# Patient Record
Sex: FEMALE | Race: WHITE | NOT HISPANIC OR LATINO | Employment: OTHER | URBAN - METROPOLITAN AREA
[De-identification: names, ages, dates, MRNs, and addresses within clinical notes are randomized per-mention and may not be internally consistent; named-entity substitution may affect disease eponyms.]

---

## 2019-09-10 ENCOUNTER — OFFICE VISIT (OUTPATIENT)
Dept: FAMILY MEDICINE CLINIC | Facility: CLINIC | Age: 64
End: 2019-09-10
Payer: MEDICARE

## 2019-09-10 ENCOUNTER — APPOINTMENT (OUTPATIENT)
Dept: LAB | Facility: CLINIC | Age: 64
End: 2019-09-10
Payer: MEDICARE

## 2019-09-10 VITALS
OXYGEN SATURATION: 98 % | HEIGHT: 62 IN | TEMPERATURE: 97.1 F | BODY MASS INDEX: 38.28 KG/M2 | HEART RATE: 60 BPM | WEIGHT: 208 LBS | SYSTOLIC BLOOD PRESSURE: 140 MMHG | DIASTOLIC BLOOD PRESSURE: 80 MMHG | RESPIRATION RATE: 16 BRPM

## 2019-09-10 DIAGNOSIS — R92.8 ABNORMAL MAMMOGRAM OF LEFT BREAST: ICD-10-CM

## 2019-09-10 DIAGNOSIS — H61.20 WAX IN EAR: ICD-10-CM

## 2019-09-10 DIAGNOSIS — E78.00 PURE HYPERCHOLESTEROLEMIA: ICD-10-CM

## 2019-09-10 DIAGNOSIS — Z76.89 ENCOUNTER TO ESTABLISH CARE: Primary | ICD-10-CM

## 2019-09-10 DIAGNOSIS — Z82.49 FH: CAD (CORONARY ARTERY DISEASE): ICD-10-CM

## 2019-09-10 DIAGNOSIS — M85.80 OSTEOPENIA, UNSPECIFIED LOCATION: ICD-10-CM

## 2019-09-10 DIAGNOSIS — Z12.39 BREAST CANCER SCREENING: ICD-10-CM

## 2019-09-10 DIAGNOSIS — I10 BENIGN ESSENTIAL HYPERTENSION: ICD-10-CM

## 2019-09-10 DIAGNOSIS — Z85.820 HISTORY OF MELANOMA: ICD-10-CM

## 2019-09-10 DIAGNOSIS — Z01.419 ENCOUNTER FOR GYNECOLOGICAL EXAMINATION WITHOUT ABNORMAL FINDING: ICD-10-CM

## 2019-09-10 LAB
ALBUMIN SERPL BCP-MCNC: 4.2 G/DL (ref 3.5–5)
ALP SERPL-CCNC: 83 U/L (ref 46–116)
ALT SERPL W P-5'-P-CCNC: 25 U/L (ref 12–78)
ANION GAP SERPL CALCULATED.3IONS-SCNC: 6 MMOL/L (ref 4–13)
AST SERPL W P-5'-P-CCNC: 20 U/L (ref 5–45)
BILIRUB SERPL-MCNC: 0.4 MG/DL (ref 0.2–1)
BUN SERPL-MCNC: 14 MG/DL (ref 5–25)
CALCIUM SERPL-MCNC: 9.8 MG/DL (ref 8.3–10.1)
CHLORIDE SERPL-SCNC: 109 MMOL/L (ref 100–108)
CHOLEST SERPL-MCNC: 198 MG/DL (ref 50–200)
CO2 SERPL-SCNC: 27 MMOL/L (ref 21–32)
CREAT SERPL-MCNC: 0.63 MG/DL (ref 0.6–1.3)
GFR SERPL CREATININE-BSD FRML MDRD: 94 ML/MIN/1.73SQ M
GLUCOSE P FAST SERPL-MCNC: 86 MG/DL (ref 65–99)
HDLC SERPL-MCNC: 74 MG/DL (ref 40–60)
LDLC SERPL CALC-MCNC: 111 MG/DL (ref 0–100)
NONHDLC SERPL-MCNC: 124 MG/DL
POTASSIUM SERPL-SCNC: 3.8 MMOL/L (ref 3.5–5.3)
PROT SERPL-MCNC: 7.5 G/DL (ref 6.4–8.2)
SODIUM SERPL-SCNC: 142 MMOL/L (ref 136–145)
TRIGL SERPL-MCNC: 66 MG/DL

## 2019-09-10 PROCEDURE — 99204 OFFICE O/P NEW MOD 45 MIN: CPT | Performed by: PHYSICIAN ASSISTANT

## 2019-09-10 PROCEDURE — 36415 COLL VENOUS BLD VENIPUNCTURE: CPT

## 2019-09-10 PROCEDURE — 80061 LIPID PANEL: CPT

## 2019-09-10 PROCEDURE — 80053 COMPREHEN METABOLIC PANEL: CPT

## 2019-09-10 RX ORDER — AMLODIPINE BESYLATE AND BENAZEPRIL HYDROCHLORIDE 5; 20 MG/1; MG/1
1 CAPSULE ORAL DAILY
COMMUNITY
Start: 2019-08-01 | End: 2019-10-25 | Stop reason: SDUPTHER

## 2019-09-10 RX ORDER — CALCIUM CARBONATE/VITAMIN D3 600 MG-10
1 TABLET ORAL DAILY
COMMUNITY

## 2019-09-10 RX ORDER — HYDROCHLOROTHIAZIDE 25 MG/1
25 TABLET ORAL DAILY
COMMUNITY
Start: 2019-08-09 | End: 2019-10-25 | Stop reason: SDUPTHER

## 2019-09-10 RX ORDER — ECHINACEA 400 MG
1 CAPSULE ORAL DAILY
COMMUNITY

## 2019-09-10 RX ORDER — PRAVASTATIN SODIUM 10 MG
10 TABLET ORAL
COMMUNITY
Start: 2019-06-13 | End: 2020-12-14 | Stop reason: SDUPTHER

## 2019-09-10 RX ORDER — MELATONIN
1000 DAILY
COMMUNITY

## 2019-09-10 RX ORDER — MULTIVITAMIN
1 TABLET ORAL DAILY
COMMUNITY

## 2019-09-10 NOTE — PROGRESS NOTES
New Patient    Jessica Rodriguez 71 y o  female   Date:  9/10/2019      Assessment and Plan:    Sayra Sanders was seen today for establish care  Diagnoses and all orders for this visit:    Encounter to establish care    History of melanoma  - given resources for new dermatologist     Breast cancer screening  -     Mammo screening bilateral w 3d & cad; Future    Abnormal mammogram of left breast  -     Mammo screening bilateral w 3d & cad; Future  - she wishes to still go to [facility] as established with them    Encounter for gynecological examination without abnormal finding  -     Ambulatory referral to Gynecology; Future    Osteopenia, unspecified location  -     DXA body comp analysis; Future  - vit d and calcium supplement    Benign essential hypertension  -     Comprehensive metabolic panel; Future  -     Lipid panel; Future  - stable, continue current regimen  - wt loss    FH: CAD (coronary artery disease)  -     Comprehensive metabolic panel; Future  -     Lipid panel; Future    Pure hypercholesterolemia  -     Comprehensive metabolic panel; Future  -     Lipid panel; Future  - continue statin    Wax in ear  - not impacted to suggest irrigation today       Depression Screening Follow-up Plan: Patient's depression screening was negative with a PHQ-2 score of 0  BMI Counseling: Body mass index is 38 04 kg/m²  The BMI is above normal  Nutrition recommendations include reducing portion sizes and decreasing overall calorie intake  Exercise recommendations include exercising 3-5 times per week  HPI:  Chief Complaint   Patient presents with   [address] PCP was seen 6 months ago - does not think medicare wellness has been completed  Last CRC about 8 years ago      HPI   Patient is a 70 yo female who presents to establish care after moving from [facility]  She was still seeing her prior PCP from [facility] every 6 months, last seen in Feb  She cannot recall last dexa but definitely over 2 years ago   She has hx of osteopenia and takes vit d and calcium  She has been following with a dermatologist every 6 months due to melanoma was found early on L shoulder, melanoma found about 5 years ago  She has follow up this Thursday but will be looking for a new one up here  She was given cards to look into Dr Mariely Aranda or Dr Ramiro Alarcon  She was seeing a cardiologist once a year, last seen in June  He was taking care of cholesterol  No recent changes  She never had stress testing done  She had carotid artery done and heart cam done for angio screening through husbands work  She is getting mammograms yearly at [facility] following cyst removal with residual scar tissue  A titatimun clip was placed  She was doing weight watchers and got down to 170 about 3 years ago  She got off track when she got a puppy and sprained her ankle, has not been as active  She had colonoscopy about 8 years ago, will get name of place  It was normal, recommend to repeat in 10 years  She thinks she did get prevnar but not sure  She was seeing an eye doctor yearly before moving, looking for a new one  She has regular dentist appt  She goes to periodontist for long time  ROS: Review of Systems   Constitutional: Negative  Respiratory: Negative  Cardiovascular: Negative  Gastrointestinal: Negative  Genitourinary: Negative  Musculoskeletal: Negative  Skin: Negative for rash and wound  A lot of freckles    Neurological: Negative  Psychiatric/Behavioral: Negative  History reviewed  No pertinent past medical history    Patient Active Problem List   Diagnosis    Abnormal mammogram of left breast    Benign essential hypertension    FH: CAD (coronary artery disease)    Melanoma in situ ([address] )    Osteopenia    Pure hypercholesterolemia    Vitamin D deficiency       Past Surgical History:   Procedure Laterality Date    BREAST CYST EXCISION      SALPINGOOPHORECTOMY      WISDOM TOOTH EXTRACTION         Social History Socioeconomic History    Marital status: /Civil Union     Spouse name: None    Number of children: None    Years of education: None    Highest education level: None   Occupational History    None   Social Needs    Financial resource strain: None    Food insecurity:     Worry: None     Inability: None    Transportation needs:     Medical: None     Non-medical: None   Tobacco Use    Smoking status: Former Smoker    Smokeless tobacco: Never Used   Substance and Sexual Activity    Alcohol use: Yes     Frequency: 2-3 times a week     Comment: couple glasses of wine a week    Drug use: Never    Sexual activity: None   Lifestyle    Physical activity:     Days per week: None     Minutes per session: None    Stress: None   Relationships    Social connections:     Talks on phone: None     Gets together: None     Attends [employer] service: None     Active member of club or organization: None     Attends meetings of clubs or organizations: None     Relationship status: None    Intimate partner violence:     Fear of current or ex partner: None     Emotionally abused: None     Physically abused: None     Forced sexual activity: None   Other Topics Concern    None   Social History Narrative    None       Family History   Problem Relation Age of Onset    Hypertension Mother     Cancer Father         lung    Heart disease Father     Arthritis Father     Asthma Father        Allergies   Allergen Reactions    Clindamycin Diarrhea and GI Intolerance     Other reaction(s): Weakness   Severe stomach pains, muscle aches  Abdominal pains   weakness      Rosuvastatin Myalgia     Myalgias  Leg cramps      Simvastatin      Myalgias  Muscle spasms           Current Outpatient Medications:     amLODIPine-benazepril (LOTREL 5-20) 5-20 MG per capsule, Take 1 capsule by mouth daily, Disp: , Rfl:     aspirin 81 MG tablet, Take 81 mg by mouth daily, Disp: , Rfl:     Calcium Carb-Cholecalciferol (CALCIUM-VITAMIN D) 600-400 MG-UNIT TABS, Take 1 tablet by mouth daily, Disp: , Rfl:     cholecalciferol (VITAMIN D3) 1,000 units tablet, Take 1,000 Units by mouth daily, Disp: , Rfl:     Coenzyme Q10 (CO Q 10) 100 MG CAPS, Take 1 capsule by mouth daily, Disp: , Rfl:     Flaxseed, Linseed, (FLAXSEED OIL) 1000 MG CAPS, Take 1 capsule by mouth daily, Disp: , Rfl:     hydrochlorothiazide (HYDRODIURIL) 25 mg tablet, Take 25 mg by mouth daily, Disp: , Rfl:     Multiple Vitamin (MULTIVITAMIN) tablet, Take 1 tablet by mouth daily, Disp: , Rfl:     pravastatin (PRAVACHOL) 10 mg tablet, Take 10 mg by mouth, Disp: , Rfl:       Physical Exam:  /80 (BP Location: Left arm, Patient Position: Sitting, Cuff Size: Large)   Pulse 60   Temp (!) 97 1 °F (36 2 °C) (Tympanic)   Resp 16   Ht 5' 2" (1 575 m)   Wt 94 3 kg (208 lb)   SpO2 98%   BMI 38 04 kg/m²     Physical Exam   Constitutional: She is oriented to person, place, and time  Vital signs are normal  She appears well-developed and well-nourished  No distress  obese   HENT:   Head: Normocephalic and atraumatic  Right Ear: Tympanic membrane, external ear and ear canal normal    Left Ear: Tympanic membrane, external ear and ear canal normal    Nose: Nose normal    Mouth/Throat: Oropharynx is clear and moist    Eyes: Pupils are equal, round, and reactive to light  Conjunctivae and lids are normal    Neck: Trachea normal and normal range of motion  Neck supple  No thyromegaly present  Cardiovascular: Normal rate, regular rhythm, S1 normal, S2 normal and intact distal pulses  Exam reveals no gallop  No murmur heard  Pulmonary/Chest: Breath sounds normal  No respiratory distress  She has no wheezes  She has no rhonchi  She has no rales  Abdominal: Soft  Normal appearance and bowel sounds are normal  She exhibits no mass  There is no hepatosplenomegaly  There is no tenderness  Musculoskeletal: Normal range of motion  She exhibits no edema or deformity  Lymphadenopathy:     She has no cervical adenopathy  Neurological: She is alert and oriented to person, place, and time  She has normal reflexes  No cranial nerve deficit or sensory deficit  Skin: Skin is warm and dry  No rash noted  No cyanosis  No pallor  Nails show no clubbing  Freckles along upper extremities, chest and neck   Psychiatric: She has a normal mood and affect   Her behavior is normal  Cognition and memory are normal

## 2019-10-10 ENCOUNTER — OFFICE VISIT (OUTPATIENT)
Dept: FAMILY MEDICINE CLINIC | Facility: CLINIC | Age: 64
End: 2019-10-10
Payer: MEDICARE

## 2019-10-10 VITALS
SYSTOLIC BLOOD PRESSURE: 130 MMHG | DIASTOLIC BLOOD PRESSURE: 80 MMHG | TEMPERATURE: 97.7 F | BODY MASS INDEX: 37.86 KG/M2 | OXYGEN SATURATION: 98 % | RESPIRATION RATE: 17 BRPM | HEART RATE: 78 BPM | WEIGHT: 207 LBS

## 2019-10-10 DIAGNOSIS — Z78.0 POSTMENOPAUSAL: ICD-10-CM

## 2019-10-10 DIAGNOSIS — Z11.59 NEED FOR HEPATITIS C SCREENING TEST: ICD-10-CM

## 2019-10-10 DIAGNOSIS — Z13.6 ENCOUNTER FOR ABDOMINAL AORTIC ANEURYSM (AAA) SCREENING: ICD-10-CM

## 2019-10-10 DIAGNOSIS — Z23 FLU VACCINE NEED: ICD-10-CM

## 2019-10-10 DIAGNOSIS — R93.7 ABNORMAL BONE DENSITY SCREENING: Primary | ICD-10-CM

## 2019-10-10 DIAGNOSIS — I10 BENIGN ESSENTIAL HYPERTENSION: ICD-10-CM

## 2019-10-10 DIAGNOSIS — E78.2 MIXED HYPERLIPIDEMIA: ICD-10-CM

## 2019-10-10 DIAGNOSIS — Z23 PNEUMOCOCCAL VACCINATION GIVEN: ICD-10-CM

## 2019-10-10 PROCEDURE — G0009 ADMIN PNEUMOCOCCAL VACCINE: HCPCS

## 2019-10-10 PROCEDURE — G0402 INITIAL PREVENTIVE EXAM: HCPCS | Performed by: PHYSICIAN ASSISTANT

## 2019-10-10 PROCEDURE — 90682 RIV4 VACC RECOMBINANT DNA IM: CPT

## 2019-10-10 PROCEDURE — 90670 PCV13 VACCINE IM: CPT

## 2019-10-10 PROCEDURE — G0008 ADMIN INFLUENZA VIRUS VAC: HCPCS

## 2019-10-10 NOTE — PATIENT INSTRUCTIONS
Medicare Preventive Visit Patient Instructions  Thank you for completing your Welcome to Medicare Visit or Medicare Annual Wellness Visit today  Your next wellness visit will be due in one year (10/10/2020)  The screening/preventive services that you may require over the next 5-10 years are detailed below  Some tests may not apply to you based off risk factors and/or age  Screening tests ordered at today's visit but not completed yet may show as past due  Also, please note that scanned in results may not display below  Preventive Screenings:  Service Recommendations Previous Testing/Comments   Colorectal Cancer Screening  * Colonoscopy    * Fecal Occult Blood Test (FOBT)/Fecal Immunochemical Test (FIT)  * Fecal DNA/Cologuard Test  * Flexible Sigmoidoscopy Age: 54-65 years old   Colonoscopy: every 10 years (may be performed more frequently if at higher risk)  OR  FOBT/FIT: every 1 year  OR  Cologuard: every 3 years  OR  Sigmoidoscopy: every 5 years  Screening may be recommended earlier than age 48 if at higher risk for colorectal cancer  Also, an individualized decision between you and your healthcare provider will decide whether screening between the ages of 74-80 would be appropriate  Colonoscopy: Not on file  FOBT/FIT: Not on file  Cologuard: Not on file  Sigmoidoscopy: Not on file         Breast Cancer Screening Age: 36 years old  Frequency: every 1-2 years  Not required if history of left and right mastectomy Mammogram: Not on file       Cervical Cancer Screening Between the ages of 21-29, pap smear recommended once every 3 years  Between the ages of 33-67, can perform pap smear with HPV co-testing every 5 years     Recommendations may differ for women with a history of total hysterectomy, cervical cancer, or abnormal pap smears in past  Pap Smear: Not on file    Screening Not Indicated   Hepatitis C Screening Once for adults born between [facility]  More frequently in patients at high risk for Hepatitis C Hep C Antibody: Not on file       Diabetes Screening 1-2 times per year if you're at risk for diabetes or have pre-diabetes Fasting glucose: 86 mg/dL   A1C: No results in last 5 years    Screening Current   Cholesterol Screening Once every 5 years if you don't have a lipid disorder  May order more often based on risk factors  Lipid panel: 09/10/2019    Screening Not Indicated  History Lipid Disorder     Other Preventive Screenings Covered by Medicare:  1  Abdominal Aortic Aneurysm (AAA) Screening: covered once if your at risk  You're considered to be at risk if you have a family history of AAA  2  Lung Cancer Screening: covers low dose CT scan once per year if you meet all of the following conditions: (1) Age 50-69; (2) No signs or symptoms of lung cancer; (3) Current smoker or have quit smoking within the last 15 years; (4) You have a tobacco smoking history of at least 30 pack years (packs per day multiplied by number of years you smoked); (5) You get a written order from a healthcare provider  3  Glaucoma Screening: covered annually if you're considered high risk: (1) You have diabetes OR (2) Family history of glaucoma OR (3)  aged 48 and older OR (3)  American aged 72 and older  3  Osteoporosis Screening: covered every 2 years if you meet one of the following conditions: (1) You're estrogen deficient and at risk for osteoporosis based off medical history and other findings; (2) Have a vertebral abnormality; (3) On glucocorticoid therapy for more than 3 months; (4) Have primary hyperparathyroidism; (5) On osteoporosis medications and need to assess response to drug therapy  · Last bone density test (DXA Scan): Not on file  5  HIV Screening: covered annually if you're between the age of 12-76  Also covered annually if you are younger than 13 and older than 72 with risk factors for HIV infection   For pregnant patients, it is covered up to 3 times per pregnancy  Immunizations:  Immunization Recommendations   Influenza Vaccine Annual influenza vaccination during flu season is recommended for all persons aged >= 6 months who do not have contraindications   Pneumococcal Vaccine (Prevnar and Pneumovax)  * Prevnar = PCV13  * Pneumovax = PPSV23   Adults 25-60 years old: 1-3 doses may be recommended based on certain risk factors  Adults 72 years old: Prevnar (PCV13) vaccine recommended followed by Pneumovax (PPSV23) vaccine  If already received PPSV23 since turning 65, then PCV13 recommended at least one year after PPSV23 dose  Hepatitis B Vaccine 3 dose series if at intermediate or high risk (ex: diabetes, end stage renal disease, liver disease)   Tetanus (Td) Vaccine - COST NOT COVERED BY MEDICARE PART B Following completion of primary series, a booster dose should be given every 10 years to maintain immunity against tetanus  Td may also be given as tetanus wound prophylaxis  Tdap Vaccine - COST NOT COVERED BY MEDICARE PART B Recommended at least once for all adults  For pregnant patients, recommended with each pregnancy  Shingles Vaccine (Shingrix) - COST NOT COVERED BY MEDICARE PART B  2 shot series recommended in those aged 48 and above     Health Maintenance Due:      Topic Date Due    Hepatitis C Screening  1955    MAMMOGRAM  1955    CRC Screening: Colonoscopy  1955     Immunizations Due:      Topic Date Due    DTaP,Tdap,and Td Vaccines (1 - Tdap) 07/07/2015    Pneumococcal Vaccine: 65+ Years (1 of 2 - PCV13) 02/25/2019    INFLUENZA VACCINE  07/01/2019     Advance Directives   What are advance directives? Advance directives are legal documents that state your wishes and plans for medical care  These plans are made ahead of time in case you lose your ability to make decisions for yourself  Advance directives can apply to any medical decision, such as the treatments you want, and if you want to donate organs     What are the types of advance directives? There are many types of advance directives, and each state has rules about how to use them  You may choose a combination of any of the following:  · Living will: This is a written record of the treatment you want  You can also choose which treatments you do not want, which to limit, and which to stop at a certain time  This includes surgery, medicine, IV fluid, and tube feedings  · Durable power of  for healthcare [facility]): This is a written record that states who you want to make healthcare choices for you when you are unable to make them for yourself  This person, called a proxy, is usually a family member or a friend  You may choose more than 1 proxy  · Do not resuscitate (DNR) order:  A DNR order is used in case your heart stops beating or you stop breathing  It is a request not to have certain forms of treatment, such as CPR  A DNR order may be included in other types of advance directives  · Medical directive: This covers the care that you want if you are in a coma, near death, or unable to make decisions for yourself  You can list the treatments you want for each condition  Treatment may include pain medicine, surgery, blood transfusions, dialysis, IV or tube feedings, and a ventilator (breathing machine)  · Values history: This document has questions about your views, beliefs, and how you feel and think about life  This information can help others choose the care that you would choose  Why are advance directives important? An advance directive helps you control your care  Although spoken wishes may be used, it is better to have your wishes written down  Spoken wishes can be misunderstood, or not followed  Treatments may be given even if you do not want them  An advance directive may make it easier for your family to make difficult choices about your care     Weight Management   Why it is important to manage your weight:  Being overweight increases your risk of health conditions such as heart disease, high blood pressure, type 2 diabetes, and certain types of cancer  It can also increase your risk for osteoarthritis, sleep apnea, and other respiratory problems  Aim for a slow, steady weight loss  Even a small amount of weight loss can lower your risk of health problems  How to lose weight safely:  A safe and healthy way to lose weight is to eat fewer calories and get regular exercise  You can lose up about 1 pound a week by decreasing the number of calories you eat by 500 calories each day  Healthy meal plan for weight management:  A healthy meal plan includes a variety of foods, contains fewer calories, and helps you stay healthy  A healthy meal plan includes the following:  · Eat whole-grain foods more often  A healthy meal plan should contain fiber  Fiber is the part of grains, fruits, and vegetables that is not broken down by your body  Whole-grain foods are healthy and provide extra fiber in your diet  Some examples of whole-grain foods are whole-wheat breads and pastas, oatmeal, brown rice, and bulgur  · Eat a variety of vegetables every day  Include dark, leafy greens such as spinach, kale, leila greens, and mustard greens  Eat yellow and orange vegetables such as carrots, sweet potatoes, and winter squash  · Eat a variety of fruits every day  Choose fresh or canned fruit (canned in its own juice or light syrup) instead of juice  Fruit juice has very little or no fiber  · Eat low-fat dairy foods  Drink fat-free (skim) milk or 1% milk  Eat fat-free yogurt and low-fat cottage cheese  Try low-fat cheeses such as mozzarella and other reduced-fat cheeses  · Choose meat and other protein foods that are low in fat  Choose beans or other legumes such as split peas or lentils  Choose fish, skinless poultry (chicken or turkey), or lean cuts of red meat (beef or pork)  Before you cook meat or poultry, cut off any visible fat  · Use less fat and oil    Try baking foods instead of frying them  Add less fat, such as margarine, sour cream, regular salad dressing and mayonnaise to foods  Eat fewer high-fat foods  Some examples of high-fat foods include french fries, doughnuts, ice cream, and cakes  · Eat fewer sweets  Limit foods and drinks that are high in sugar  This includes candy, cookies, regular soda, and sweetened drinks  Exercise:  Exercise at least 30 minutes per day on most days of the week  Some examples of exercise include walking, biking, dancing, and swimming  You can also fit in more physical activity by taking the stairs instead of the elevator or parking farther away from stores  Ask your healthcare provider about the best exercise plan for you  © Copyright [employer] 2018 Information is for End User's use only and may not be sold, redistributed or otherwise used for commercial purposes   All illustrations and images included in CareNotes® are the copyrighted property of [employer]  or [address] [place] [address]

## 2019-10-10 NOTE — PROGRESS NOTES
Assessment and Plan:     Problem List Items Addressed This Visit        Cardiovascular and Mediastinum    Benign essential hypertension    Relevant Orders    Comprehensive metabolic panel      Other Visit Diagnoses     Abnormal bone density screening    -  Primary    Relevant Orders    DXA body comp analysis    Postmenopausal        Relevant Orders    DXA body comp analysis    Flu vaccine need        Relevant Orders    influenza vaccine, 6760-8422, quadrivalent, recombinant, PF, 0 5 mL, for patients 18 yr+ (FLUBLOK) (Completed)    Encounter for abdominal aortic aneurysm (AAA) screening        Relevant Orders    US abdominal aorta screening aaa    Need for hepatitis C screening test        Relevant Orders    Hepatitis C antibody    Mixed hyperlipidemia        Relevant Orders    Lipid panel    Pneumococcal vaccination given        Relevant Orders    PNEUMOCOCCAL CONJUGATE VACCINE 13-VALENT GREATER THAN 6 MONTHS (Completed)           Preventive health issues were discussed with patient, and age appropriate screening tests were ordered as noted in patient's After Visit Summary  Personalized health advice and appropriate referrals for health education or preventive services given if needed, as noted in patient's After Visit Summary  History of Present Illness:     Patient presents for Medicare Annual Wellness visit    Patient Care Team:  Esau Luna PA-C as PCP - General (Family Medicine)     Problem List:     Patient Active Problem List   Diagnosis    Abnormal mammogram of left breast    Benign essential hypertension    FH: CAD (coronary artery disease)    Melanoma in situ ([address] )    Osteopenia    Pure hypercholesterolemia    Vitamin D deficiency      Past Medical and Surgical History:     No past medical history on file    Past Surgical History:   Procedure Laterality Date    BREAST CYST EXCISION      SALPINGOOPHORECTOMY      WISDOM TOOTH EXTRACTION        Family History:     Family History Problem Relation Age of Onset    Hypertension Mother    Vilma Splinter Cancer Father         lung    Heart disease Father     Arthritis Father     Asthma Father       Social History:     Social History     Socioeconomic History    Marital status: /Civil Union     Spouse name: None    Number of children: None    Years of education: None    Highest education level: None   Occupational History    None   Social Needs    Financial resource strain: None    Food insecurity:     Worry: None     Inability: None    Transportation needs:     Medical: None     Non-medical: None   Tobacco Use    Smoking status: Former Smoker    Smokeless tobacco: Never Used   Substance and Sexual Activity    Alcohol use: Yes     Frequency: 2-3 times a week     Comment: couple glasses of wine a week    Drug use: Never    Sexual activity: None   Lifestyle    Physical activity:     Days per week: None     Minutes per session: None    Stress: None   Relationships    Social connections:     Talks on phone: None     Gets together: None     Attends Nondenominational service: None     Active member of club or organization: None     Attends meetings of clubs or organizations: None     Relationship status: None    Intimate partner violence:     Fear of current or ex partner: None     Emotionally abused: None     Physically abused: None     Forced sexual activity: None   Other Topics Concern    None   Social History Narrative    None       Medications and Allergies:     Current Outpatient Medications   Medication Sig Dispense Refill    amLODIPine-benazepril (LOTREL 5-20) 5-20 MG per capsule Take 1 capsule by mouth daily      aspirin 81 MG tablet Take 81 mg by mouth daily      Calcium Carb-Cholecalciferol (CALCIUM-VITAMIN D) 600-400 MG-UNIT TABS Take 1 tablet by mouth daily      cholecalciferol (VITAMIN D3) 1,000 units tablet Take 1,000 Units by mouth daily      Coenzyme Q10 (CO Q 10) 100 MG CAPS Take 1 capsule by mouth daily      Flaxseed, Linseed, (FLAXSEED OIL) 1000 MG CAPS Take 1 capsule by mouth daily      hydrochlorothiazide (HYDRODIURIL) 25 mg tablet Take 25 mg by mouth daily      Multiple Vitamin (MULTIVITAMIN) tablet Take 1 tablet by mouth daily      pravastatin (PRAVACHOL) 10 mg tablet Take 10 mg by mouth       No current facility-administered medications for this visit  Allergies   Allergen Reactions    Clindamycin Diarrhea and GI Intolerance     Other reaction(s): Weakness   Severe stomach pains, muscle aches  Abdominal pains  weakness      Rosuvastatin Myalgia     Myalgias  Leg cramps      Simvastatin      Myalgias  Muscle spasms        Immunizations:     Immunization History   Administered Date(s) Administered     Influenza (IM) Preservative Free 10/13/2010    INFLUENZA 10/02/2012, 01/17/2014, 01/07/2015, 01/08/2016, 02/12/2018    Influenza, recombinant, quadrivalent,injectable, preservative free 10/10/2019    Pneumococcal Conjugate 13-Valent 10/10/2019    Td (adult), adsorbed 07/06/2015    Zoster 07/21/2016      Health Maintenance:         Topic Date Due    Hepatitis C Screening  1955    MAMMOGRAM  1955    DXA SCAN  02/02/2017    CRC Screening: Colonoscopy  03/07/2027         Topic Date Due    DTaP,Tdap,and Td Vaccines (1 - Tdap) 07/07/2015      Medicare Health Risk Assessment:     /80   Pulse 78   Temp 97 7 °F (36 5 °C)   Resp 17   Wt 93 9 kg (207 lb)   SpO2 98%   BMI 37 86 kg/m²      Hailee Finley is here for her Welcome to Medicare visit  Health Risk Assessment:   Patient rates overall health as excellent  Patient feels that their physical health rating is much better  Eyesight was rated as slightly worse  Hearing was rated as same  Patient feels that their emotional and mental health rating is much better  Pain experienced in the last 7 days has been some  Patient's pain rating has been 1/10  Patient states that she has experienced no weight loss or gain in last 6 months       Depression Screening:   PHQ-2 Score: 0      Fall Risk Screening: In the past year, patient has experienced: no history of falling in past year      Urinary Incontinence Screening:   Patient has not leaked urine accidently in the last six months  Home Safety:  Patient does not have trouble with stairs inside or outside of their home  Patient has working smoke alarms and has no working carbon monoxide detector  Home safety hazards include: none  Nutrition:   Current diet is Regular  Medications:   Patient is currently taking over-the-counter supplements  OTC medications include: see medication list  Patient is able to manage medications  Activities of Daily Living (ADLs)/Instrumental Activities of Daily Living (IADLs):   Walk and transfer into and out of bed and chair?: Yes  Dress and groom yourself?: Yes    Bathe or shower yourself?: Yes    Feed yourself? Yes  Do your laundry/housekeeping?: Yes  Manage your money, pay your bills and track your expenses?: Yes  Make your own meals?: Yes    Do your own shopping?: Yes    Previous Hospitalizations:   Any hospitalizations or ED visits within the last 12 months?: No      Advance Care Planning:   Living will: No    Durable POA for healthcare:  Yes    Advanced directive: No    Advanced directive counseling given: Yes      Cognitive Screening:   Provider or family/friend/caregiver concerned regarding cognition?: No    PREVENTIVE SCREENINGS      Cardiovascular Screening:    General: Screening Not Indicated, History Lipid Disorder and Screening Current      Diabetes Screening:     General: Screening Current      Colorectal Cancer Screening:     General: Screening Current      Breast Cancer Screening:       Due for: Mammogram        Cervical Cancer Screening:    General: Screening Not Indicated      Osteoporosis Screening:      Due for: Bone Density Ultrasound      Abdominal Aortic Aneurysm (AAA) Screening:      Due for: Screening AAA Ultrasound      Lung Cancer Screening: General: Screening Not Indicated      Hepatitis C Screening:    General: Risks and Benefits Discussed    Hep C Screening Accepted: Yes        Preventive Screening Comments: Has mammogram script but needs to schedule    Physical Exam   Constitutional: She is oriented to person, place, and time  She appears well-developed and well-nourished  No distress  obese   HENT:   Head: Normocephalic and atraumatic  Right Ear: External ear normal    Left Ear: External ear normal    Nose: Nose normal    Mouth/Throat: Oropharynx is clear and moist  No oropharyngeal exudate  Eyes: Pupils are equal, round, and reactive to light  Conjunctivae are normal    Neck: Normal range of motion  Neck supple  No JVD present  No thyromegaly present  Cardiovascular: Normal rate, regular rhythm and normal heart sounds  No murmur heard  Pulmonary/Chest: Effort normal  No respiratory distress  She has no wheezes  Abdominal: Soft  Bowel sounds are normal  She exhibits no distension  Musculoskeletal: She exhibits no edema or deformity  Neurological: She is alert and oriented to person, place, and time  No cranial nerve deficit  Skin: Skin is warm and dry  No rash noted  Psychiatric: She has a normal mood and affect   Her behavior is normal      Jeremy Tinajero PA-C

## 2019-10-25 DIAGNOSIS — Z76.0 MEDICATION REFILL: Primary | ICD-10-CM

## 2019-10-25 RX ORDER — HYDROCHLOROTHIAZIDE 25 MG/1
25 TABLET ORAL DAILY
Qty: 90 TABLET | Refills: 1 | Status: SHIPPED | OUTPATIENT
Start: 2019-10-25 | End: 2020-04-15 | Stop reason: SDUPTHER

## 2019-10-25 RX ORDER — AMLODIPINE BESYLATE AND BENAZEPRIL HYDROCHLORIDE 5; 20 MG/1; MG/1
1 CAPSULE ORAL DAILY
Qty: 90 CAPSULE | Refills: 1 | Status: SHIPPED | OUTPATIENT
Start: 2019-10-25 | End: 2020-04-15 | Stop reason: SDUPTHER

## 2019-11-18 ENCOUNTER — OFFICE VISIT (OUTPATIENT)
Dept: OBGYN CLINIC | Facility: MEDICAL CENTER | Age: 64
End: 2019-11-18
Payer: MEDICARE

## 2019-11-18 ENCOUNTER — APPOINTMENT (OUTPATIENT)
Dept: RADIOLOGY | Facility: MEDICAL CENTER | Age: 64
End: 2019-11-18
Payer: MEDICARE

## 2019-11-18 VITALS
WEIGHT: 205 LBS | DIASTOLIC BLOOD PRESSURE: 76 MMHG | SYSTOLIC BLOOD PRESSURE: 143 MMHG | HEART RATE: 80 BPM | HEIGHT: 62 IN | BODY MASS INDEX: 37.73 KG/M2

## 2019-11-18 DIAGNOSIS — M75.111 NONTRAUMATIC INCOMPLETE TEAR OF RIGHT ROTATOR CUFF: Primary | ICD-10-CM

## 2019-11-18 DIAGNOSIS — M25.511 RIGHT SHOULDER PAIN, UNSPECIFIED CHRONICITY: ICD-10-CM

## 2019-11-18 PROCEDURE — 99203 OFFICE O/P NEW LOW 30 MIN: CPT | Performed by: ORTHOPAEDIC SURGERY

## 2019-11-18 PROCEDURE — 73030 X-RAY EXAM OF SHOULDER: CPT

## 2019-11-18 PROCEDURE — 20610 DRAIN/INJ JOINT/BURSA W/O US: CPT | Performed by: ORTHOPAEDIC SURGERY

## 2019-11-18 RX ORDER — LIDOCAINE HYDROCHLORIDE 10 MG/ML
3 INJECTION, SOLUTION INFILTRATION; PERINEURAL
Status: COMPLETED | OUTPATIENT
Start: 2019-11-18 | End: 2019-11-18

## 2019-11-18 RX ORDER — METHYLPREDNISOLONE ACETATE 40 MG/ML
1 INJECTION, SUSPENSION INTRA-ARTICULAR; INTRALESIONAL; INTRAMUSCULAR; SOFT TISSUE
Status: COMPLETED | OUTPATIENT
Start: 2019-11-18 | End: 2019-11-18

## 2019-11-18 RX ADMIN — METHYLPREDNISOLONE ACETATE 1 ML: 40 INJECTION, SUSPENSION INTRA-ARTICULAR; INTRALESIONAL; INTRAMUSCULAR; SOFT TISSUE at 10:35

## 2019-11-18 RX ADMIN — LIDOCAINE HYDROCHLORIDE 3 ML: 10 INJECTION, SOLUTION INFILTRATION; PERINEURAL at 10:35

## 2019-11-18 NOTE — PROGRESS NOTES
Ortho Sports Medicine Shoulder New Patient Visit     Assesment:   71year old Female Right shoulder possible small rotator cuff tear     Plan:    Conservative treatment:    Ice to shoulder 1-2 times daily, for 20 minutes at a time  PT for ROM and strengthening to shoulder, rotator cuff, scapular stabilizers  Imaging: All imaging from today was reviewed by myself and explained to the patient  Injection:    The risks and benefits of the injection (which include but are not limited to: infection, bleeding,damage to nerve/artery, need for further intervention), as well as the risks and benefits of all alternative treatments were explained and understood  The patient elected to proceed with injection  The procedure was done with aseptic technique, and the patient tolerated the procedure well with no complications  A corticosteroid injection of the subacromial space was performed  Ice to the shoulder 1-2 times daily for 20 minutes, for next 24-48 hrs  Surgery:     No surgery is recommended at this point, continue with conservative treatment plan as noted  Follow up:    Return in about 6 weeks (around 12/30/2019) for Recheck  If no relief consider MRI to r/o cuff tear         Chief Complaint   Patient presents with    Right Upper Arm - Pain    Right Shoulder - Pain       History of Present Illness: The patient is a 71 y o , right hand dominant female whose occupation is retired, referred to me by their primary care physician, seen in clinic for consultation of right shoulder pain  The patient denies a history of diabetes  The patient denies a history of thyroid disorder  Pain is located anterior  The patient rates the pain as a 3/10  The pain has been present for 1 years  The patient sustained an injury in August of 2018 while moving  She stated that while moving she felt a pop in the shoulder but wasn't sure if she was imaging it   Since then she has been experiencing an intermittent ache in the anterior aspect of her shoulder that is in the proximal bicep region  She mostly experiences pain at night and has to sleep with her arm on her chest  She will also get pain with reaching laterally with external rotation, but it is occasion and described as pulling  She has been taking motrin which helps but doesn't want to be take too much and upset her stomach  The pain is characterized as dull, achy  The pain is present daily  Pain is improved by rest and NSAIDS  Pain is aggravated by overhead activity and reaching back  Symptoms include popping  The patient denies weakness  The patient denies numbness and tingling  The patient has tried rest, ice and NSAIDS  Shoulder Surgical History:  None    Past Medical, Social and Family History:  History reviewed  No pertinent past medical history  Past Surgical History:   Procedure Laterality Date    BREAST CYST EXCISION      SALPINGOOPHORECTOMY      WISDOM TOOTH EXTRACTION       Allergies   Allergen Reactions    Clindamycin Diarrhea and GI Intolerance     Other reaction(s): Weakness   Severe stomach pains, muscle aches  Abdominal pains   weakness      Rosuvastatin Myalgia     Myalgias  Leg cramps      Simvastatin      Myalgias  Muscle spasms       Current Outpatient Medications on File Prior to Visit   Medication Sig Dispense Refill    amLODIPine-benazepril (LOTREL 5-20) 5-20 MG per capsule Take 1 capsule by mouth daily 90 capsule 1    aspirin 81 MG tablet Take 81 mg by mouth daily      Calcium Carb-Cholecalciferol (CALCIUM-VITAMIN D) 600-400 MG-UNIT TABS Take 1 tablet by mouth daily      cholecalciferol (VITAMIN D3) 1,000 units tablet Take 1,000 Units by mouth daily      Coenzyme Q10 (CO Q 10) 100 MG CAPS Take 1 capsule by mouth daily      Flaxseed, Linseed, (FLAXSEED OIL) 1000 MG CAPS Take 1 capsule by mouth daily      hydrochlorothiazide (HYDRODIURIL) 25 mg tablet Take 1 tablet (25 mg total) by mouth daily 90 tablet 1    Multiple Vitamin (MULTIVITAMIN) tablet Take 1 tablet by mouth daily      pravastatin (PRAVACHOL) 10 mg tablet Take 10 mg by mouth       No current facility-administered medications on file prior to visit  Social History     Socioeconomic History    Marital status: /Civil Union     Spouse name: Not on file    Number of children: Not on file    Years of education: Not on file    Highest education level: Not on file   Occupational History    Not on file   Social Needs    Financial resource strain: Not on file    Food insecurity:     Worry: Not on file     Inability: Not on file    Transportation needs:     Medical: Not on file     Non-medical: Not on file   Tobacco Use    Smoking status: Former Smoker    Smokeless tobacco: Never Used   Substance and Sexual Activity    Alcohol use: Yes     Frequency: 2-3 times a week     Comment: couple glasses of wine a week    Drug use: Never    Sexual activity: Not on file   Lifestyle    Physical activity:     Days per week: Not on file     Minutes per session: Not on file    Stress: Not on file   Relationships    Social connections:     Talks on phone: Not on file     Gets together: Not on file     Attends Shinto service: Not on file     Active member of club or organization: Not on file     Attends meetings of clubs or organizations: Not on file     Relationship status: Not on file    Intimate partner violence:     Fear of current or ex partner: Not on file     Emotionally abused: Not on file     Physically abused: Not on file     Forced sexual activity: Not on file   Other Topics Concern    Not on file   Social History Narrative    Not on file         I have reviewed the past medical, surgical, social and family history, medications and allergies as documented in the EMR  Review of systems: ROS is negative other than that noted in the HPI  Constitutional: Negative for fatigue and fever  HENT: Negative for sore throat  Respiratory: Negative for shortness of breath  Cardiovascular: Negative for chest pain  Gastrointestinal: Negative for abdominal pain  Endocrine: Negative for cold intolerance and heat intolerance  Genitourinary: Negative for flank pain  Musculoskeletal: Negative for back pain  Skin: Negative for rash  Allergic/Immunologic: Negative for immunocompromised state  Neurological: Negative for dizziness  Psychiatric/Behavioral: Negative for agitation  Physical Exam:    Blood pressure 143/76, pulse 80, height 5' 2" (1 575 m), weight 93 kg (205 lb)      General/Constitutional: NAD, well developed, well nourished  HENT: Normocephalic, atraumatic  CV: Intact distal pulses, regular rate  Resp: No respiratory distress or labored breathing  Lymphatic: No lymphadenopathy palpated  Neuro: Alert and Oriented x 3, no focal deficits  Psych: Normal mood, normal affect, normal judgement, normal behavior  Skin: Warm, dry, no rashes, no erythema      Shoulder focused exam:       RIGHT LEFT    Scapula Atrophy Negative Negative     Winging Negative Negative     Protraction Negative Negative    Rotator cuff SS 4+/5 5/5     IS 5/5 5/5     SubS 5/5 5/5    ROM     170     ER0 60 60                   IRb T6    T6    TTP: AC Negative Negative     Biceps Negative Negative     Coracoid Negative Negative    Special Tests: O'Briens Negative Negative     Cartagena-shear Positive minimally  Negative     Cross body Adduction Negative Negative     Speeds  Negative Negative     Светлана's Negative Negative     Whipple Positive Negative       Neer Negative Negative     Whelan Negative Negative    Instability: Apprehension & relocation not tested not tested     Load & shift not tested not tested    Other: Crank Negative Negative                 UE NV Exam: +2 Radial pulses bilaterally  Sensation intact to light touch C5-T1 bilaterally, Radial/median/ulnar nerve motor intact      Bilateral elbow, wrist, and and forearm ROM full, painless with passive ROM, no ttp or crepitance throughout extremities below shoulder joint    Cervical ROM is full without pain, numbness or tingling      Shoulder Imaging    X-rays of the right shoulder were reviewed, which demonstrate no acute fracture with moderate AC joint OA  I have reviewed the radiology report and do not currently have a radiology reading from Dain Alvarado, but will check the result once the reading is performed      Large joint arthrocentesis: R subacromial bursa  Date/Time: 11/18/2019 10:35 AM  Consent given by: patient  Site marked: site marked  Timeout: Immediately prior to procedure a time out was called to verify the correct patient, procedure, equipment, support staff and site/side marked as required   Supporting Documentation  Indications: pain   Procedure Details  Location: shoulder - R subacromial bursa  Preparation: Patient was prepped and draped in the usual sterile fashion  Needle size: 22 G  Ultrasound guidance: no  Approach: posterior  Medications administered: 3 mL lidocaine 1 %; 1 mL methylPREDNISolone acetate 40 mg/mL    Patient tolerance: patient tolerated the procedure well with no immediate complications  Dressing:  Sterile dressing applied

## 2019-11-21 ENCOUNTER — EVALUATION (OUTPATIENT)
Dept: PHYSICAL THERAPY | Facility: CLINIC | Age: 64
End: 2019-11-21
Payer: MEDICARE

## 2019-11-21 DIAGNOSIS — M75.111 NONTRAUMATIC INCOMPLETE TEAR OF RIGHT ROTATOR CUFF: ICD-10-CM

## 2019-11-21 PROCEDURE — 97110 THERAPEUTIC EXERCISES: CPT | Performed by: PHYSICAL THERAPIST

## 2019-11-21 PROCEDURE — 97535 SELF CARE MNGMENT TRAINING: CPT | Performed by: PHYSICAL THERAPIST

## 2019-11-21 PROCEDURE — 97161 PT EVAL LOW COMPLEX 20 MIN: CPT | Performed by: PHYSICAL THERAPIST

## 2019-11-21 NOTE — PROGRESS NOTES
PT Evaluation     Today's date: 2019  Patient name: Wilson Gordon  : 1955  MRN: [id number]  Referring provider: Olga Rios DO  Dx:   Encounter Diagnosis     ICD-10-CM    1  Nontraumatic incomplete tear of right rotator cuff M75 [id number] Ambulatory referral to Physical Therapy                  Assessment  Assessment details: PT notes the patient with decrease ROM and strength t/o right shoulder and scapula with demonstration of impaired UB posture leading to increase pain levels and functional limitations with need for course of skilled therapy for 6 weeks with focus on scapular stabilization, manual therapy, posture, analgesic modalities, and HEP  Impairments: abnormal or restricted ROM, activity intolerance, impaired physical strength, lacks appropriate home exercise program, pain with function, scapular dyskinesis and poor posture   Understanding of Dx/Px/POC: good   Prognosis: good    Goals  ST  Initiate HEP   2  Decrease pain levels by 25-50%   3  Increase strength by 1-2 mm grades  4  Increase ROM to WNL t/o the right shoulder and scapula  LT  Improve postural awareness  2  Improve scapular stability   3  Decrease limitations with OH activities and lifting  4  Decrease limitations with reaching, ADL, pushing and pulling  5  DC with HEP    Plan  Plan details: PT notes review of POC and findings with patient who is in agreement with PT recommendations of course of skilled therapy      Patient would benefit from: PT eval and skilled physical therapy  Planned modality interventions: thermotherapy: hydrocollator packs  Planned therapy interventions: neuromuscular re-education, patient education, postural training, self care, strengthening, stretching, therapeutic exercise, home exercise program, graded exercise, functional ROM exercises and flexibility  Frequency: 2x week  Duration in visits: 12  Duration in weeks: 6  Treatment plan discussed with: patient        Subjective Evaluation    History of Present Illness  Mechanism of injury: Patient reports she was lifting and moving boxes at home when she experienced increase pain in the right shoulder  Patient reports over time the symptoms worsened leading to limitations with reaching, pushing, pulling, OH activities, ADL, and recreational activities  Patient reports she went to ortho MD for evaluation and received a cortisone injection in the right shoulder and patient was referred to OPPT for further evaluation and treatment of right shoulder pain  Patient reports 50% improvement since the injection but continuation of pulling in the shoulder with limitations with lifting, pulling, and pushing activities  Patient reports she is retired so no work limitations  Patient reports significant PMH of HBP  Pain  Current pain ratin  At best pain ratin  At worst pain ratin  Location: Right Shoulder   Quality: burning and dull ache  Relieving factors: rest  Aggravating factors: lifting and overhead activity    Treatments  Current treatment: injection treatment and physical therapy  Patient Goals  Patient goals for therapy: decreased pain, increased motion, increased strength and independence with ADLs/IADLs          Objective     Postural Observations  Seated posture: fair  Standing posture: fair    Additional Postural Observation Details  PT notes the patient with bilateral rounded shoulders and forward head     Palpation     Right   Tenderness of the biceps, infraspinatus, subscapularis and supraspinatus  Tenderness     Right Shoulder  Tenderness in the acromion and bicipital groove       Neurological Testing     Reflexes   Left   Biceps (C5/C6): normal (2+)  Brachioradialis (C6): normal (2+)    Right   Biceps (C5/C6): normal (2+)  Brachioradialis (C6): normal (2+)    Active Range of Motion   Cervical/Thoracic Spine     Normal active range of motion  Left Shoulder   Normal active range of motion  Internal rotation BTB: T5 Right Shoulder   Flexion: WFL  Extension: WFL  Abduction: WFL  Adduction: WFL  External rotation 90°: WFL  Internal rotation BTB: T11     Left Elbow   Normal active range of motion    Right Elbow   Normal active range of motion    Scapular Mobility     Right Shoulder   Scapular Dyskinesis: grade I  Scapular mobility: good    Strength/Myotome Testing     Left Shoulder   Normal muscle strength    Right Shoulder     Planes of Motion   Flexion: 4   Extension: 5   Abduction: 4   Adduction: 4+   External rotation at 90°: 4-   Internal rotation at 90°: 4     Left Elbow   Normal strength    Right Elbow   Flexion: 4  Extension: 4+    Additional Strength Details  PT notes minimal loss of ROM but decrease strength and stability t/o the right shoulder and scapula     Tests     Right Shoulder   Positive Neer's and Yergason's                Precautions:  None       Manual  11/21       N/A                                            Exercise Diary  11/21       UBE  120 resist  10 min   Alt        TB MTP and LTP        Scapular wall slides         Reverse wall slides         Scapular pinch into wall         TB PNF         Rear deltoid machine         Lat PD and Row         S/L Scapular 4 way         Prone scap stabilization         Serratus wall slides         Ball into wall                                                                             Modalities 11/21       MHP to the right shoulder and UB in seated  15 min

## 2019-11-25 ENCOUNTER — OFFICE VISIT (OUTPATIENT)
Dept: PHYSICAL THERAPY | Facility: CLINIC | Age: 64
End: 2019-11-25
Payer: MEDICARE

## 2019-11-25 DIAGNOSIS — M75.111 NONTRAUMATIC INCOMPLETE TEAR OF RIGHT ROTATOR CUFF: Primary | ICD-10-CM

## 2019-11-25 PROCEDURE — 97110 THERAPEUTIC EXERCISES: CPT | Performed by: PHYSICAL THERAPIST

## 2019-11-25 PROCEDURE — 97535 SELF CARE MNGMENT TRAINING: CPT | Performed by: PHYSICAL THERAPIST

## 2019-11-25 NOTE — PROGRESS NOTES
Daily Note     Today's date: 2019  Patient name: Johanna Medrano  : 1955  MRN: [id number]  Referring provider: Ruth Telles DO  Dx:   Encounter Diagnosis     ICD-10-CM    1  Nontraumatic incomplete tear of right rotator cuff M75 111                   Subjective:  Patient reports the shoulder is sore today with 6/10 pain levels  Patient reports the HEP stretches are tough at home but the patient reports she knows she has to do the exercises  Post session, the patient reports feeling better with loosening in the arm to 4/10 level  Objective: See treatment diary below      Assessment: Tolerated treatment well  PT notes start of TE program with focus on scapular stabilization and HEP to decrease pain levels and improve functional limitations to meet therapy goals  PT notes the patient with continuation of decrease strength and stability t/o the right shoulder and scapula with need for continuation of skilled therapy  Plan: Continue per plan of care        Precautions:  None       Manual        N/A                                            Exercise Diary        UBE  120 resist  10 min   Alt  120 resist  Alt  10 min       TB MTP and LTP  2x10 Each   Green   (old way)      Scapular wall slides I,Y,T,V  2x10 Each       Reverse wall slides         Scapular pinch into wall   10x5" Hold       TB PNF         Rear deltoid machine   2x10   24#       Lat PD and Row         S/L Scapular 4 way   20x Each   1#       Prone scap stabilization         Serratus wall slides         Ball into wall         TB Bilateral ER   15x Green       Doorway pec stretch   5x15" Hold                                               HEP update/review   10 min           Modalities       MHP to the right shoulder and UB in seated  15 min  15 min

## 2019-11-27 ENCOUNTER — OFFICE VISIT (OUTPATIENT)
Dept: PHYSICAL THERAPY | Facility: CLINIC | Age: 64
End: 2019-11-27
Payer: MEDICARE

## 2019-11-27 DIAGNOSIS — M75.111 NONTRAUMATIC INCOMPLETE TEAR OF RIGHT ROTATOR CUFF: Primary | ICD-10-CM

## 2019-11-27 PROCEDURE — 97140 MANUAL THERAPY 1/> REGIONS: CPT | Performed by: PHYSICAL THERAPIST

## 2019-11-27 PROCEDURE — 97110 THERAPEUTIC EXERCISES: CPT | Performed by: PHYSICAL THERAPIST

## 2019-11-27 NOTE — PROGRESS NOTES
Daily Note     Today's date: 2019  Patient name: Niels Ham  : 1955  MRN: [id number]  Referring provider: Rory Orosco DO  Dx:   Encounter Diagnosis     ICD-10-CM    1  Nontraumatic incomplete tear of right rotator cuff M75 111                   Subjective:  Patient reports she was putting on a sweatshirt this morning when she felt a soreness in the back of the right shoulder  Patient reports the pain was sharp to start and then faded away with soreness  Patient reports 3/10 soreness in the posterior left shoulder but post session states no soreness in the left shoulder  Objective: See treatment diary below      Assessment: Tolerated treatment well  PT notes addition of manual therapy to the posterior left shoulder with scapular mobs to address subjective c/o but PT will continue to progress toward therapy goals as tolerated by patient  Plan: Continue per plan of care        Precautions:  None       Manual       N/A   Right posterior capsule stretch with STM to the proximal Tricep and scapular mobs   10 min                                          Exercise Diary        UBE  120 resist  10 min   Alt  120 resist  Alt  10 min  120 resist  10 min   Alt      TB MTP and LTP  2x10 Each   Gaetana Levi   (old way) 2x10 each   Gaetana Levi   (old way)      Scapular wall slides I,Y,T,V  2x10 Each  2x10 Each      Reverse wall slides         Scapular pinch into wall   10x5" Hold  15x5" Hold      TB PNF         Rear deltoid machine   2x10   24#  2x10   24#      Lat PD and Row         S/L Scapular 4 way   20x Each   1#  20x Each   1#     Prone scap stabilization         Serratus wall slides         Ball into wall    Playground ball  10x Each      TB Bilateral ER   15x Green  2x10   Green      Doorway pec stretch   5x15" Hold  5x15" Hold                                              HEP update/review   10 min           Modalities       MHP to the right shoulder and UB in seated  15 min  15 min 15 min

## 2019-12-02 ENCOUNTER — APPOINTMENT (OUTPATIENT)
Dept: PHYSICAL THERAPY | Facility: CLINIC | Age: 64
End: 2019-12-02
Payer: MEDICARE

## 2019-12-09 ENCOUNTER — OFFICE VISIT (OUTPATIENT)
Dept: PHYSICAL THERAPY | Facility: CLINIC | Age: 64
End: 2019-12-09
Payer: MEDICARE

## 2019-12-09 DIAGNOSIS — M75.111 NONTRAUMATIC INCOMPLETE TEAR OF RIGHT ROTATOR CUFF: Primary | ICD-10-CM

## 2019-12-09 PROCEDURE — 97150 GROUP THERAPEUTIC PROCEDURES: CPT

## 2019-12-09 NOTE — PROGRESS NOTES
Daily Note     Today's date: 2019  Patient name: Lavell Pop  : 1955  MRN: [id number]  Referring provider: Carli Kapoor DO  Dx:   Encounter Diagnosis     ICD-10-CM    1  Nontraumatic incomplete tear of right rotator cuff M75 111                   Subjective: Patient states she tripped going up the steps at a friends house and "face planted"  She has bilateral eye and nose ecchymosis  She states she has not received medical attention for it, but has been icing  Patient denies further injury to her shoulder in the fall  Objective: See treatment diary below      Assessment: Tolerated treatment well  Patient demonstrated fatigue post treatment  Patient demonstrated good understanding of exercises  Plan: Progress treatment as tolerated         Precautions:  None       Manual      N/A   Right posterior capsule stretch with STM to the proximal Tricep and scapular mobs   10 min                                          Exercise Diary      UBE  120 resist  10 min   Alt  120 resist  Alt  10 min  120 resist  10 min   Alt  120 Resist 10 min ALT    TB MTP and LTP  2x10 Each   Dasha Boop   (old way) 2x10 each   Dasha Boop   (old way)  2x10 Green    Scapular wall slides I,Y,T,V  2x10 Each  2x10 Each  2x10 ea    Reverse wall slides         Scapular pinch into wall   10x5" Hold  15x5" Hold  15x 5" hold    TB PNF         Rear deltoid machine   2x10   24#  2x10   24#  2x10 24#    Lat PD and Row         S/L Scapular 4 way   20x Each   1#  20x Each   1# 20x 1#    Prone scap stabilization         Serratus wall slides         Ball into wall    Playground ball  10x Each  10x ea PG Ball    TB Bilateral ER   15x Green  2x10   Green  2x10 Green ea    Doorway pec stretch   5x15" Hold  5x15" Hold  5x 15" hd                                            HEP update/review   10 min           Modalities     MHP to the right shoulder and UB in seated  15 min  15 min 15 min  15 min

## 2019-12-11 ENCOUNTER — OFFICE VISIT (OUTPATIENT)
Dept: PHYSICAL THERAPY | Facility: CLINIC | Age: 64
End: 2019-12-11
Payer: MEDICARE

## 2019-12-11 DIAGNOSIS — M75.111 NONTRAUMATIC INCOMPLETE TEAR OF RIGHT ROTATOR CUFF: Primary | ICD-10-CM

## 2019-12-11 PROCEDURE — 97140 MANUAL THERAPY 1/> REGIONS: CPT

## 2019-12-11 PROCEDURE — 97150 GROUP THERAPEUTIC PROCEDURES: CPT

## 2019-12-11 NOTE — PROGRESS NOTES
Daily Note     Today's date: 2019  Patient name: Huong Suarez  : 1955  MRN: [id number]  Referring provider: Aaliyah Zeng DO  Dx:   Encounter Diagnosis     ICD-10-CM    1  Nontraumatic incomplete tear of right rotator cuff M75 111                   Subjective: "My shoulder has been pretty good lately!"      Objective: See treatment diary below      Assessment: Tolerated treatment well  Patient exhibited good technique with therapeutic exercises  Patient had difficulty completing reverse wall slides demonstrating the need for further therapy to improve scapular movement  Plan: Continue per plan of care        Precautions:  None       Manual     N/A   Right posterior capsule stretch with STM to the proximal Tricep and scapular mobs   10 min                                          Exercise Diary     UBE  120 resist  10 min   Alt  120 resist  Alt  10 min  120 resist  10 min   Alt  120 Resist 10 min  Resist 10 min ALT   TB MTP and LTP  2x10 Each   Nick Shouts   (old way) 2x10 each   Nick Shouts   (old way)  2x10 Green 2x10 Blue   Scapular wall slides I,Y,T,V  2x10 Each  2x10 Each  2x10 ea 2x10    Reverse wall slides      10x   Scapular pinch into wall   10x5" Hold  15x5" Hold  15x 5" hold 2x10 5" hd   TB PNF         Rear deltoid machine   2x10   24#  2x10   24#  2x10 24# 2x10 24#   Lat PD and Row         S/L Scapular 4 way   20x Each   1#  20x Each   1# 20x 1# 10x ea 2#   Prone scap stabilization         Serratus wall slides         Ball into wall    Playground ball  10x Each  10x ea PG Ball 10x ea PG Ball   TB Bilateral ER   15x Green  2x10   Green  2x10 Green ea 2x10 Green ea   Doorway pec stretch   5x15" Hold  5x15" Hold  5x 15" hd 5x 15" hd                                           HEP update/review   10 min           Modalities    MHP to the right shoulder and UB in seated  15 min  15 min 15 min  15 min 15 min

## 2019-12-16 ENCOUNTER — OFFICE VISIT (OUTPATIENT)
Dept: PHYSICAL THERAPY | Facility: CLINIC | Age: 64
End: 2019-12-16
Payer: MEDICARE

## 2019-12-16 DIAGNOSIS — M75.111 NONTRAUMATIC INCOMPLETE TEAR OF RIGHT ROTATOR CUFF: Primary | ICD-10-CM

## 2019-12-16 PROCEDURE — 97140 MANUAL THERAPY 1/> REGIONS: CPT

## 2019-12-16 PROCEDURE — 97110 THERAPEUTIC EXERCISES: CPT

## 2019-12-16 NOTE — PROGRESS NOTES
Daily Note     Today's date: 2019  Patient name: Simi Morrell  : 1955  MRN: [id number]  Referring provider: Prashant Francois DO  Dx:   Encounter Diagnosis     ICD-10-CM    1  Nontraumatic incomplete tear of right rotator cuff M75 111                   Subjective: Patient reports she over did it a bit this weekend shopping  There is some increased soreness but overall feels improvement  Objective: See treatment diary below      Assessment: Tolerated treatment well  Patient had difficulty with reverse walls slide demonstrating that the patient would benefit from continued PT to improve scapular movement  Some fatigue with increased weight as well  Set was held to 10 repetitions only  Plan: Continue per plan of care        Precautions:  None       Manual     N/A   Right posterior capsule stretch with STM to the proximal Tricep and scapular mobs   10 min                                          Exercise Diary     UBE  110 resist  10 min   Alt  120 resist  Alt  10 min  120 resist  10 min   Alt  120 Resist 10 min  Resist 10 min ALT   TB MTP and LTP 2x10 Blue 2x10 Each   [place]   (old way) 2x10 each   [place]   (old way)  2x10 Green 2x10 Blue   Scapular wall slides I,Y,T,V 2x10 ea 2x10 Each  2x10 Each  2x10 ea 2x10    Reverse wall slides  10x    10x   Scapular pinch into wall  2x10 5" hd 10x5" Hold  15x5" Hold  15x 5" hold 2x10 5" hd   TB PNF         Rear deltoid machine  2x10 24# 2x10   24#  2x10   24#  2x10 24# 2x10 24#   Lat PD and Row         S/L Scapular 4 way  2x10 2# 20x Each   1#  20x Each   1# 20x 1# 10x ea 2#   Prone scap stabilization         Serratus wall slides         Ball into wall  15x ea PG Ball  Playground ball  10x Each  10x ea PG Ball 15x ea PG Ball   TB Bilateral ER  2x10 Blue ea 15x Green  2x10   Green  2x10 Green ea 2x10 Green ea   Doorway pec stretch  5x 15" hd 5x15" Hold  5x15" Hold  5x 15" hd 5x 15" hd HEP update/review   10 min           Modalities 12/16 11/25 11/27 12/9 12/11   MHP to the right shoulder and UB in seated  15 min  15 min 15 min  15 min 15 min

## 2019-12-18 ENCOUNTER — EVALUATION (OUTPATIENT)
Dept: PHYSICAL THERAPY | Facility: CLINIC | Age: 64
End: 2019-12-18
Payer: MEDICARE

## 2019-12-18 DIAGNOSIS — M75.111 NONTRAUMATIC INCOMPLETE TEAR OF RIGHT ROTATOR CUFF: Primary | ICD-10-CM

## 2019-12-18 PROCEDURE — 97110 THERAPEUTIC EXERCISES: CPT

## 2019-12-18 PROCEDURE — 97140 MANUAL THERAPY 1/> REGIONS: CPT | Performed by: PHYSICAL THERAPIST

## 2019-12-18 PROCEDURE — 97140 MANUAL THERAPY 1/> REGIONS: CPT

## 2019-12-18 NOTE — PROGRESS NOTES
Daily Note     Today's date: 2019  Patient name: Wynette Kawasaki  : 1955  MRN: [id number]  Referring provider: Lena Fry DO  Dx:   Encounter Diagnosis     ICD-10-CM    1  Nontraumatic incomplete tear of right rotator cuff M75 111                   Subjective: Patient reports decreased episodes of pain  Objective: See treatment diary below      Assessment: Patient was re-evaluated by supervising physical therapist   [place] fair  Patient demonstrated fatigue post treatment  Patient is making good progress towards goals  Plan: Continue per plan of care        Precautions:  None       Manual     N/A   Right posterior capsule stretch with STM to the proximal Tricep and scapular mobs   10 min                                          Exercise Diary     UBE  110 resist  10 min   Alt  120 resist  Alt  10 min  120 resist  10 min   Alt  120 Resist 10 min  Resist 10 min ALT   TB MTP and LTP 2x10 Blue 2x10 Each   Emmalene Locker   (old way) 2x10 each   Emmalene Locker   (old way)  2x10 Green 2x10 Blue   Scapular wall slides I,Y,T,V 2x10 ea 2x10 Each  2x10 Each  2x10 ea 2x10    Reverse wall slides  10x 10x   10x   Scapular pinch into wall  2x10 5" hd 10x5" Hold  15x5" Hold  15x 5" hold 2x10 5" hd   TB PNF         Rear deltoid machine  2x10 24# 2x10   24#  2x10   24#  2x10 24# 2x10 24#   Lat PD and Row         S/L Scapular 4 way  10x ea 2# 20x Each   2#  20x Each   1# 20x 1# 10x ea 2#   Prone scap stabilization         Serratus wall slides         Ball into wall  15x ea PG Ball 15x ea PG Ball Playground ball  10x Each  10x ea PG Ball 15x ea PG Ball   TB Bilateral ER  15x Blue ea 15x  Blue ea 2x10   Green  2x10 Green ea 2x10 Green ea   Doorway pec stretch  5x 15" hd 5x15" Hold  5x15" Hold  5x 15" hd 5x 15" hd                                           HEP update/review             Modalities    MHP to the right shoulder and UB in seated  15 min  15 min 15 min  15 min 15 min

## 2019-12-18 NOTE — PROGRESS NOTES
PT Re-Evaluation     Today's date: 2019  Patient name: Stephanie Salazar  : 1955  MRN: [id number]  Referring provider: Britt Clark DO  Dx:   Encounter Diagnosis     ICD-10-CM    1  Nontraumatic incomplete tear of right rotator cuff M75 111                   Assessment  Assessment details: PT notes the patient with improved ROM but continuation of decrease strength and stability t/o right shoulder and scapula with demonstration of impaired UB posture leading to increase pain levels and functional limitations with need for continuation of skilled therapy for 2 weeks with focus on scapular stabilization, manual therapy, posture, analgesic modalities, and update/review of HEP with progression to HEP/wellness program     Impairments: abnormal or restricted ROM, activity intolerance, impaired physical strength, lacks appropriate home exercise program, pain with function, scapular dyskinesis and poor posture   Understanding of Dx/Px/POC: good   Prognosis: good    Goals  ST  Initiate HEP-MET   2  Decrease pain levels by 25-50%-MET   3  Increase strength by 1-2 mm grades-MET  4  Increase ROM to WNL t/o the right shoulder and scapula-WNL  LT  Improve postural awareness-Partial MET  2  Improve scapular stability-Partial MET   3  Decrease limitations with OH activities and lifting-Partial MET  4    Decrease limitations with reaching, ADL, pushing and pulling-Partial MET  5   DC with HEP-Progressing     Plan  Plan details: PT notes review of POC and findings with patient who is in agreement with PT recommendations of continuaiton of skilled therapy with progression to HEP/wellness program      Patient would benefit from: PT eval and skilled physical therapy  Planned modality interventions: thermotherapy: hydrocollator packs  Planned therapy interventions: neuromuscular re-education, patient education, postural training, self care, strengthening, stretching, therapeutic exercise, home exercise program, graded exercise, functional ROM exercises and flexibility  Frequency: 2x week  Duration in visits: 4  Duration in weeks: 2  Treatment plan discussed with: patient        Subjective Evaluation    History of Present Illness  Mechanism of injury: Patient reports she was lifting and moving boxes at home when she experienced increase pain in the right shoulder  Patient reports over time the symptoms worsened leading to limitations with reaching, pushing, pulling, OH activities, ADL, and recreational activities  Patient reports she went to ortho MD for evaluation and received a cortisone injection in the right shoulder and patient was referred to OPPT for further evaluation and treatment of right shoulder pain  Patient reports 50% improvement since the injection but continuation of pulling in the shoulder with limitations with lifting, pulling, and pushing activities  Patient reports she is retired so no work limitations  Patient reports significant PMH of HBP  Presently the patient has attended 7 sessions of skilled therapy and feels 80% improvement since the start of therapy  Patient reports the arm is moving better with overall decrease pain but continuation of occasional pulling in the back of the shoulder with limitations with OH activities  Patient feels she needs more therapy to continue to get stronger and improve lifting      Pain  Current pain ratin  At best pain ratin  At worst pain rating: 3  Location: Right Shoulder   Quality: burning and dull ache  Relieving factors: rest  Aggravating factors: lifting and overhead activity  Progression: improved    Treatments  Current treatment: injection treatment and physical therapy  Patient Goals  Patient goals for therapy: decreased pain, increased motion, increased strength and independence with ADLs/IADLs          Objective     Postural Observations  Seated posture: fair  Standing posture: fair    Additional Postural Observation Details  PT notes the patient with bilateral rounded shoulders and forward head     Palpation     Right   Tenderness of the biceps, infraspinatus, subscapularis and supraspinatus  Tenderness     Right Shoulder  Tenderness in the acromion and bicipital groove  Neurological Testing     Reflexes   Left   Biceps (C5/C6): normal (2+)  Brachioradialis (C6): normal (2+)    Right   Biceps (C5/C6): normal (2+)  Brachioradialis (C6): normal (2+)    Active Range of Motion   Cervical/Thoracic Spine     Normal active range of motion  Left Shoulder   Normal active range of motion  Internal rotation BTB: T5     Right Shoulder   Flexion: WFL  Extension: WFL  Abduction: WFL  Adduction: WFL  External rotation 90°: WFL  Internal rotation BTB: T8     Left Elbow   Normal active range of motion    Right Elbow   Normal active range of motion    Scapular Mobility     Right Shoulder   Scapular Dyskinesis: none  Scapular mobility: WFL    Strength/Myotome Testing     Left Shoulder   Normal muscle strength    Right Shoulder     Planes of Motion   Flexion: 4+   Extension: 5   Abduction: 4   Adduction: 5   External rotation at 90°: 4+   Internal rotation at 90°: 4+     Left Elbow   Normal strength    Right Elbow   Flexion: 4+  Extension: 4+    Additional Strength Details  PT notes minimal loss of ROM with continuation of decrease strength and stability t/o the right shoulder and scapula     Tests     Right Shoulder   Positive Neer's  Negative Yergason's                Precautions:  None

## 2019-12-24 ENCOUNTER — OFFICE VISIT (OUTPATIENT)
Dept: PHYSICAL THERAPY | Facility: CLINIC | Age: 64
End: 2019-12-24
Payer: MEDICARE

## 2019-12-24 DIAGNOSIS — M75.111 NONTRAUMATIC INCOMPLETE TEAR OF RIGHT ROTATOR CUFF: Primary | ICD-10-CM

## 2019-12-24 PROCEDURE — 97110 THERAPEUTIC EXERCISES: CPT

## 2019-12-24 NOTE — PROGRESS NOTES
Daily Note     Today's date: 2019  Patient name: Chikis Tang  : 1955  MRN: [id number]  Referring provider: Francis Parisi DO  Dx:   Encounter Diagnosis     ICD-10-CM    1  Nontraumatic incomplete tear of right rotator cuff M75 111                   Subjective: Patient states she is doing well overall  She has had no difficulty with her holiday preparations  Objective: See treatment diary below      Assessment: Tolerated treatment well  Patient exhibited good technique with therapeutic exercises  Patient is making good progress towards goals  Patient may be able to progress next treatment if pain & symptoms remain low  Plan: Continue per plan of care  Progress next visit        Precautions:  None       Manual       N/A                                            Exercise Diary       UBE  110 resist  10 min   Alt  120 resist  Alt  10 min  120 resist  10 min   Alt      TB MTP and LTP 2x10 Blue 2x10 Each   Blue  (old way) 2x10 ea Blue     Scapular wall slides I,Y,T,V 2x10 ea 2x10 Each  2x10     Reverse wall slides  10x 10x 10x      Scapular pinch into wall  2x10 5" hd 10x5" Hold  10x 5" hd     TB PNF    NV     Rear deltoid machine  2x10 24# 2x10   24#  2x10 24#     Lat PD and Row         S/L Scapular 4 way  10x ea 2# 20x Each   2#  20x ea 2#     Prone scap stabilization    NV     Serratus wall slides         Ball into wall  15x ea PG Ball 15x ea PG Ball 15x ea PG Ball     TB Bilateral ER  15x Blue ea 15x  Blue ea 2x10 Blue ea     Doorway pec stretch  5x 15" hd 5x15" Hold  5x 15" hold                                             HEP update/review             Modalities      MHP to the right shoulder and UB in seated  15 min  15 min 15 min

## 2019-12-26 ENCOUNTER — OFFICE VISIT (OUTPATIENT)
Dept: PHYSICAL THERAPY | Facility: CLINIC | Age: 64
End: 2019-12-26
Payer: MEDICARE

## 2019-12-26 DIAGNOSIS — M75.111 NONTRAUMATIC INCOMPLETE TEAR OF RIGHT ROTATOR CUFF: Primary | ICD-10-CM

## 2019-12-26 PROCEDURE — 97110 THERAPEUTIC EXERCISES: CPT

## 2019-12-26 NOTE — PROGRESS NOTES
Daily Note     Today's date: 2019  Patient name: Wei Taveras  : 1955  MRN: [id number]  Referring provider: Renetta Baig DO  Dx:   Encounter Diagnosis     ICD-10-CM    1  Nontraumatic incomplete tear of right rotator cuff M75 111                   Subjective: Patient states she continues to improve overall  Objective: See treatment diary below      Assessment: Tolerated progression of  treatment well  Patient demonstrated fatigue post treatment  Patient is making good progress toward goals  Plan: Continue per plan of care  Possible discharge at end of next week       Precautions:  None       Manual      N/A                                            Exercise Diary      UBE  110 resist  10 min   Alt  120 resist  Alt  10 min  120 resist  10 min   Alt  110 resist 10 min ALT    TB MTP and LTP 2x10 Blue 2x10 Each   Blue  (old way) 2x10 ea Blue 2x10 ea Blue    Scapular wall slides I,Y,T,V 2x10 ea 2x10 Each  2x10 2x10    Reverse wall slides  10x 10x 10x  10x    Scapular pinch into wall  2x10 5" hd 10x5" Hold  10x 5" hd 15x 5" hd    TB PNF    NV 15x Green ea    Rear deltoid machine  2x10 24# 2x10   24#  2x10 24# 2x10 24#    Lat PD and Row         S/L Scapular 4 way  10x ea 2# 20x Each   2#  20x ea 2# 20x ea 2#    Prone scap stabilization     NV    Serratus wall slides         Ball into wall  15x ea PG Ball 15x ea PG Ball 15x ea PG Ball 10x Red MB    TB Bilateral ER  15x Blue ea 15x  Blue ea 2x10 Blue ea 2x10 Blue    Doorway pec stretch  5x 15" hd 5x15" Hold  5x 15" hold 5x 15" hd                                            HEP update/review             Modalities     MHP to the right shoulder and UB in seated  15 min  15 min 15 min  15 min

## 2019-12-30 ENCOUNTER — OFFICE VISIT (OUTPATIENT)
Dept: OBGYN CLINIC | Facility: MEDICAL CENTER | Age: 64
End: 2019-12-30
Payer: MEDICARE

## 2019-12-30 VITALS
DIASTOLIC BLOOD PRESSURE: 81 MMHG | BODY MASS INDEX: 38.09 KG/M2 | SYSTOLIC BLOOD PRESSURE: 147 MMHG | HEART RATE: 65 BPM | WEIGHT: 207 LBS | HEIGHT: 62 IN

## 2019-12-30 DIAGNOSIS — M25.511 RIGHT SHOULDER PAIN, UNSPECIFIED CHRONICITY: Primary | ICD-10-CM

## 2019-12-30 PROCEDURE — 99213 OFFICE O/P EST LOW 20 MIN: CPT | Performed by: ORTHOPAEDIC SURGERY

## 2019-12-30 NOTE — PROGRESS NOTES
Ortho Sports Medicine Shoulder Follow Up Visit     Assesment:   69-year-old female Right shoulder possible small rotator cuff tear  Doing well with no pain at this time after physical therapy     Plan:    Conservative treatment:    Ice to shoulder 1-2 times daily, for 20 minutes at a time  Home exercise program for shoulder, including ROM and strenthening  Instructions given to patient of what exercises to perform  Let pain guide return to activities  Imaging:    No imaging was available for review today  Injection:    No Injection planned at this time  Surgery:     No surgery is recommended at this point, continue with conservative treatment plan as noted  Follow up:    Return if symptoms worsen or fail to improve  Chief Complaint   Patient presents with    Right Upper Arm - Follow-up     History of Present Illness: The patient is returns for follow up of right shoulder pain  Since the prior visit, She reports significant improvement  At her last appointment she received a corticosteroid injection, which she states helped her  She had no pain after and was able to complete physical therapy exercises  She states that she is 85% better at this time  Pain is improved by rest, ice, NSAIDS, physical therapy and injection  Pain is aggravated by lifting  Symptoms include catching  The patient denies weakness  The patient has tried rest, ice, NSAIDS, physical therapy and injection  I have reviewed the past medical, surgical, social and family history, medications and allergies as documented in the EMR  Review of systems: ROS is negative other than that noted in the HPI  Constitutional: Negative for fatigue and fever  Physical Exam:    Blood pressure 147/81, pulse 65, height 5' 2" (1 575 m), weight 93 9 kg (207 lb)      General/Constitutional: NAD, well developed, well nourished  HENT: Normocephalic, atraumatic  CV: Intact distal pulses, regular rate  Resp: No respiratory distress or labored breathing  Lymphatic: No lymphadenopathy palpated  Neuro: Alert and Oriented x 3, no focal deficits  Psych: Normal mood, normal affect, normal judgement, normal behavior  Skin: Warm, dry, no rashes, no erythema    Shoulder focused exam:       RIGHT LEFT    Scapula Atrophy Negative Negative     Winging Negative Negative     Protraction Negative Negative    Rotator cuff SS 4+/5 5/5     IS 5/5 5/5     SubS 5/5 5/5    ROM     170     ER0 60 60     ER90 90    90     IR90 T6    T6     IRb T6    T6    TTP: AC Negative Negative     Biceps Negative Negative     Coracoid Negative Negative    Special Tests: O'Briens Negative Negative     Cartagena-shear Negative Negative     Cross body Adduction Negative Negative     Speeds  Negative Negative     Светлана's Negative Negative     Whipple Positive Negative       Neer Negative Negative     Whelan Negative Negative    Instability: Apprehension & relocation not tested not tested     Load & shift not tested not tested    Other: Crank Negative Negative             UE NV Exam: +2 Radial pulses bilaterally  Sensation intact to light touch C5-T1 bilaterally, Radial/median/ulnar nerve motor intact    Cervical ROM is full without pain, numbness or tingling    Shoulder Imaging    No imaging was performed today

## 2019-12-31 ENCOUNTER — OFFICE VISIT (OUTPATIENT)
Dept: PHYSICAL THERAPY | Facility: CLINIC | Age: 64
End: 2019-12-31
Payer: MEDICARE

## 2019-12-31 DIAGNOSIS — M75.111 NONTRAUMATIC INCOMPLETE TEAR OF RIGHT ROTATOR CUFF: Primary | ICD-10-CM

## 2019-12-31 PROCEDURE — 97110 THERAPEUTIC EXERCISES: CPT

## 2019-12-31 NOTE — PROGRESS NOTES
Daily Note     Today's date: 2019  Patient name: Allegra Herrera  : 1955  MRN: [id number]  Referring provider: Marylu Brito DO  Dx:   Encounter Diagnosis     ICD-10-CM    1  Nontraumatic incomplete tear of right rotator cuff M75 111                   Subjective: Patient offers no complaints of pain  "It's been good"      Objective: See treatment diary below      Assessment: Tolerated treatment well  Patient exhibited good technique with therapeutic exercises  Patient has made good progress toward goals  Plan: Potential discharge next visit       Precautions:  None       Manual     N/A                                            Exercise Diary     UBE  110 resist  10 min   Alt  120 resist  Alt  10 min  120 resist  10 min   Alt  110 resist 10 min  Resist 10 min ALT   TB MTP and LTP 2x10 Blue 2x10 Each   Blue  (old way) 2x10 ea Blue 2x10 ea Blue 2x10 ea Blue   Scapular wall slides I,Y,T,V 2x10 ea 2x10 Each  2x10 2x10 2x10   Reverse wall slides  10x 10x 10x  10x 10x   Scapular pinch into wall  2x10 5" hd 10x5" Hold  10x 5" hd 15x 5" hd 15x 5" hd   TB PNF    NV 15x Green ea 15x ea Green   Rear deltoid machine  2x10 24# 2x10   24#  2x10 24# 2x10 24# 2x10 24#   Lat PD and Row         S/L Scapular 4 way  10x ea 2# 20x Each   2#  20x ea 2# 20x ea 2# 20x ea 2#   Prone scap stabilization         Serratus wall slides         Ball into wall  15x ea PG Ball 15x ea PG Ball 15x ea PG Ball 10x Red MB 10x ea Red MB   TB Bilateral ER  15x Blue ea 15x  Blue ea 2x10 Blue ea 2x10 Blue 2x10 Blue   Doorway pec stretch  5x 15" hd 5x15" Hold  5x 15" hold 5x 15" hd 5x 15" hd                                           HEP update/review             Modalities    MHP to the right shoulder and UB in seated  15 min  15 min 15 min  15 min 15 min

## 2020-01-02 ENCOUNTER — OFFICE VISIT (OUTPATIENT)
Dept: PHYSICAL THERAPY | Facility: CLINIC | Age: 65
End: 2020-01-02
Payer: MEDICARE

## 2020-01-02 DIAGNOSIS — M75.111 NONTRAUMATIC INCOMPLETE TEAR OF RIGHT ROTATOR CUFF: Primary | ICD-10-CM

## 2020-01-02 PROCEDURE — 97535 SELF CARE MNGMENT TRAINING: CPT

## 2020-01-02 PROCEDURE — 97150 GROUP THERAPEUTIC PROCEDURES: CPT

## 2020-01-02 NOTE — PROGRESS NOTES
PT Discharge    Today's date: 2020  Patient name: Royce Russell  : 1955  MRN: [id number]  Referring provider: Rex Kline DO  Dx:   Encounter Diagnosis     ICD-10-CM    1  Nontraumatic incomplete tear of right rotator cuff M75 111        Start Time: 0900  Stop Time: 0950  Total time in clinic (min): 50 minutes    Assessment  Assessment details: PT notes the patient with improved ROM, strength, and stability t/o right shoulder and scapula with demonstration of improved UB posture so PT notes the patient has met all therapy goals and is ready for a DC with HEP  Impairments: abnormal or restricted ROM, activity intolerance, impaired physical strength, lacks appropriate home exercise program, pain with function, scapular dyskinesis and poor posture   Understanding of Dx/Px/POC: good   Prognosis: good    Goals  ST  Initiate HEP-MET   2  Decrease pain levels by 25-50%-MET   3  Increase strength by 1-2 mm grades-MET  4  Increase ROM to WNL t/o the right shoulder and scapula-WNL  LT  Improve postural awareness-MET  2  Improve scapular stability-MET   3  Decrease limitations with OH activities and lifting-MET  4  Decrease limitations with reaching, ADL, pushing and pulling-MET  5   DC with HEP-MET     Plan  Plan details: DC with HEP     Patient would benefit from: PT eval and skilled physical therapy  Planned modality interventions: thermotherapy: hydrocollator packs  Planned therapy interventions: neuromuscular re-education, patient education, postural training, self care, strengthening, stretching, therapeutic exercise, home exercise program, graded exercise, functional ROM exercises and flexibility  Frequency: 2x week  Duration in visits: 1  Duration in weeks: 1  Treatment plan discussed with: patient        Subjective Evaluation    History of Present Illness  Mechanism of injury: Patient reports she was lifting and moving boxes at home when she experienced increase pain in the right shoulder  Patient reports over time the symptoms worsened leading to limitations with reaching, pushing, pulling, OH activities, ADL, and recreational activities  Patient reports she went to ortho MD for evaluation and received a cortisone injection in the right shoulder and patient was referred to OPPT for further evaluation and treatment of right shoulder pain  Patient reports 50% improvement since the injection but continuation of pulling in the shoulder with limitations with lifting, pulling, and pushing activities  Patient reports she is retired so no work limitations  Patient reports significant PMH of HBP  Presently the patient has attended 11 sessions of skilled therapy and feels 90% improvement since the start of therapy  Patient states she is happy with current status and feels she is ready for a DC with HEP  Pain  Current pain ratin  At best pain ratin  At worst pain rating: 3  Location: Right Shoulder   Quality: burning and dull ache  Relieving factors: rest  Aggravating factors: lifting and overhead activity  Progression: improved    Treatments  Current treatment: injection treatment and physical therapy  Patient Goals  Patient goals for therapy: decreased pain, increased motion, increased strength and independence with ADLs/IADLs          Objective     Postural Observations  Seated posture: fair  Standing posture: fair    Additional Postural Observation Details  PT notes the patient with bilateral rounded shoulders and forward head     Palpation     Right   Tenderness of the biceps, infraspinatus, subscapularis and supraspinatus  Tenderness     Right Shoulder  Tenderness in the acromion and bicipital groove       Neurological Testing     Reflexes   Left   Biceps (C5/C6): normal (2+)  Brachioradialis (C6): normal (2+)    Right   Biceps (C5/C6): normal (2+)  Brachioradialis (C6): normal (2+)    Active Range of Motion   Cervical/Thoracic Spine     Normal active range of motion  Left Shoulder   Normal active range of motion  Internal rotation BTB: T5     Right Shoulder   Flexion: WFL  Extension: WFL  Abduction: WFL  Adduction: WFL  External rotation 90°: WFL  Internal rotation BTB: T5     Left Elbow   Normal active range of motion    Right Elbow   Normal active range of motion    Scapular Mobility     Right Shoulder   Scapular Dyskinesis: none  Scapular mobility: WFL    Strength/Myotome Testing     Left Shoulder   Normal muscle strength    Right Shoulder     Planes of Motion   Flexion: 5   Extension: 5   Abduction: 5   Adduction: 5   External rotation at 90°: 5   Internal rotation at 90°: 5     Left Elbow   Normal strength    Right Elbow   Flexion: 5  Extension: 5    Additional Strength Details  PT notes the patient with improved ROM and strength and stability t/o the right shoulder and scapula     Tests     Right Shoulder   Positive Neer's  Negative Yergason's                Precautions:  None

## 2020-01-02 NOTE — PROGRESS NOTES
Daily Note     Today's date: 2020  Patient name: Debarah Lennox  : 1955  MRN: [id number]  Referring provider: Bailee Baker DO  Dx:   Encounter Diagnosis     ICD-10-CM    1  Nontraumatic incomplete tear of right rotator cuff M75 111                   Subjective: Patient offers no complaints, she is happy with her progress      Objective: See treatment diary below      Assessment: Patient has made good progress towards goals  Patient was provided with instructed in and performed HEP with good understanding shown  Plan: Patient will discontinue formal therapy and continue with HEP        Precautions:  None       Manual     N/A                                            Exercise Diary     UBE  110 resist  10 min   Alt  120 resist  Alt  10 min  120 resist  10 min   Alt  110 resist 10 min  Resist 10 min ALT   TB MTP and LTP 2x10 Blue 2x10 Each   Blue  (old way) 2x10 ea Blue 2x10 ea Blue 2x10 ea Blue   Scapular wall slides I,Y,T,V 2x10 ea 2x10 Each  2x10 2x10 2x10   Reverse wall slides   10x 10x  10x 10x   Scapular pinch into wall  2x10 5" hd 10x5" Hold  10x 5" hd 15x 5" hd 15x 5" hd   TB PNF    NV 15x Green ea 15x ea Green   Rear deltoid machine   2x10   24#  2x10 24# 2x10 24# 2x10 24#   Lat PD and Row         S/L Scapular 4 way   20x Each   2#  20x ea 2# 20x ea 2# 20x ea 2#   Prone scap stabilization         Serratus wall slides         Ball into wall   15x ea PG Ball 15x ea PG Ball 10x Red MB 10x ea Red MB   TB Bilateral ER  15x Blue ea 15x  Blue ea 2x10 Blue ea 2x10 Blue 2x10 Blue   Doorway pec stretch  5x 15" hd 5x15" Hold  5x 15" hold 5x 15" hd 5x 15" hd   Caudal Glide 5x 15"hd                                       HEP update/review  x           Modalities    MHP to the right shoulder and UB in seated  15 min  15 min 15 min  15 min 15 min

## 2020-01-27 ENCOUNTER — HOSPITAL ENCOUNTER (OUTPATIENT)
Dept: MAMMOGRAPHY | Facility: HOSPITAL | Age: 65
Discharge: HOME | End: 2020-01-27
Payer: MEDICARE

## 2020-01-27 VITALS — WEIGHT: 206 LBS | BODY MASS INDEX: 37.91 KG/M2 | HEIGHT: 62 IN

## 2020-01-27 DIAGNOSIS — R92.8 ABNORMAL MAMMOGRAM OF LEFT BREAST: ICD-10-CM

## 2020-01-27 DIAGNOSIS — Z12.39 BREAST CANCER SCREENING: ICD-10-CM

## 2020-01-27 PROCEDURE — 77063 BREAST TOMOSYNTHESIS BI: CPT

## 2020-01-27 PROCEDURE — 77067 SCR MAMMO BI INCL CAD: CPT

## 2020-01-30 ENCOUNTER — APPOINTMENT (OUTPATIENT)
Dept: PHYSICAL THERAPY | Facility: CLINIC | Age: 65
End: 2020-01-30
Payer: MEDICARE

## 2020-03-03 ENCOUNTER — OFFICE VISIT (OUTPATIENT)
Dept: OBGYN CLINIC | Facility: MEDICAL CENTER | Age: 65
End: 2020-03-03
Payer: MEDICARE

## 2020-03-03 VITALS — DIASTOLIC BLOOD PRESSURE: 70 MMHG | BODY MASS INDEX: 38.72 KG/M2 | WEIGHT: 211.7 LBS | SYSTOLIC BLOOD PRESSURE: 150 MMHG

## 2020-03-03 DIAGNOSIS — Z12.31 ENCOUNTER FOR SCREENING MAMMOGRAM FOR MALIGNANT NEOPLASM OF BREAST: ICD-10-CM

## 2020-03-03 DIAGNOSIS — Z01.419 ENCOUNTER FOR GYNECOLOGICAL EXAMINATION WITH PAPANICOLAOU SMEAR OF CERVIX: Primary | ICD-10-CM

## 2020-03-03 PROCEDURE — G0145 SCR C/V CYTO,THINLAYER,RESCR: HCPCS | Performed by: OBSTETRICS & GYNECOLOGY

## 2020-03-03 PROCEDURE — G0101 CA SCREEN;PELVIC/BREAST EXAM: HCPCS | Performed by: OBSTETRICS & GYNECOLOGY

## 2020-03-03 NOTE — PROGRESS NOTES
ASSESSMENT & PLAN: Rosa Helton is a 76 y o   with normal gynecologic exam     1   Routine well woman exam done today  2  Pap and HPV:  The patient's last pap and hpv was 2017  It was normal     Pap without cotesting was done today  Current ASCCP Guidelines reviewed  Per patient's request  3  Mammogram ordered  4  Colorectal cancer screening was notordered  Done   5  The following were reviewed in today's visit: breast self exam and mammography screening ordered  CC:  Annual Gynecologic Examination    HPI: Rosa Helton is a 76 y o  Maria Esther Mike who presents for annual gynecologic examination  She has the following concerns:  No GYN complaints; establishing care in [facility] after moving from [facility] Maintenance:    She wears her seatbelt routinely  She does perform regular monthly self breast exams  She feels safe at home  Last PAP & HPV: 2017  Last mammogram:    Last colonoscopy:     History reviewed  No pertinent past medical history      Past Surgical History:   Procedure Laterality Date    BREAST BIOPSY Left 2017    benign    BREAST CYST EXCISION Left     benign    SALPINGOOPHORECTOMY  1973    WISDOM TOOTH EXTRACTION         Past OB/Gyn History:  OB History        1    Para   1    Term   1            AB        Living   1       SAB        TAB        Ectopic        Multiple        Live Births   1               History of abnormal pap smears: No        Family History   Problem Relation Age of Onset    Hypertension Mother     Cancer Father         lung    Heart disease Father     Arthritis Father     Asthma Father     No Known Problems Daughter     No Known Problems Maternal Grandmother     No Known Problems Maternal Grandfather     No Known Problems Paternal Grandmother     No Known Problems Paternal Grandfather     No Known Problems Maternal Aunt     No Known Problems Paternal Aunt     No Known Problems Paternal Aunt Social History:  Social History     Socioeconomic History    Marital status: /Civil Union     Spouse name: Not on file    Number of children: Not on file    Years of education: Not on file    Highest education level: Not on file   Occupational History    Not on file   Social Needs    Financial resource strain: Not on file    Food insecurity:     Worry: Not on file     Inability: Not on file    Transportation needs:     Medical: Not on file     Non-medical: Not on file   Tobacco Use    Smoking status: Former Smoker    Smokeless tobacco: Never Used   Substance and Sexual Activity    Alcohol use: Yes     Frequency: 2-3 times a week     Comment: couple glasses of wine a week    Drug use: Never    Sexual activity: Yes     Partners: Male     Birth control/protection: Post-menopausal   Lifestyle    Physical activity:     Days per week: Not on file     Minutes per session: Not on file    Stress: Not on file   Relationships    Social connections:     Talks on phone: Not on file     Gets together: Not on file     Attends Quaker service: Not on file     Active member of club or organization: Not on file     Attends meetings of clubs or organizations: Not on file     Relationship status: Not on file    Intimate partner violence:     Fear of current or ex partner: Not on file     Emotionally abused: Not on file     Physically abused: Not on file     Forced sexual activity: Not on file   Other Topics Concern    Not on file   Social History Narrative    Not on file         Allergies   Allergen Reactions    Clindamycin Diarrhea and GI Intolerance     Other reaction(s): Weakness   Severe stomach pains, muscle aches  Abdominal pains   weakness      Rosuvastatin Myalgia     Myalgias  Leg cramps      Simvastatin      Myalgias  Muscle spasms           Current Outpatient Medications:     amLODIPine-benazepril (LOTREL 5-20) 5-20 MG per capsule, Take 1 capsule by mouth daily, Disp: 90 capsule, Rfl: 1    aspirin 81 MG tablet, Take 81 mg by mouth daily, Disp: , Rfl:     Calcium Carb-Cholecalciferol (CALCIUM-VITAMIN D) 600-400 MG-UNIT TABS, Take 1 tablet by mouth daily, Disp: , Rfl:     cholecalciferol (VITAMIN D3) 1,000 units tablet, Take 1,000 Units by mouth daily, Disp: , Rfl:     Coenzyme Q10 (CO Q 10) 100 MG CAPS, Take 1 capsule by mouth daily, Disp: , Rfl:     Flaxseed, Linseed, (FLAXSEED OIL) 1000 MG CAPS, Take 1 capsule by mouth daily, Disp: , Rfl:     hydrochlorothiazide (HYDRODIURIL) 25 mg tablet, Take 1 tablet (25 mg total) by mouth daily, Disp: 90 tablet, Rfl: 1    Multiple Vitamin (MULTIVITAMIN) tablet, Take 1 tablet by mouth daily, Disp: , Rfl:     pravastatin (PRAVACHOL) 10 mg tablet, Take 10 mg by mouth, Disp: , Rfl:     Review of Systems  Constitutional :no fever, feels well, no tiredness, no recent weight gain or loss  ENT: no ear ache, no loss of hearing, no nosebleeds or nasal discharge, no sore throat or hoarseness  Cardiovascular: no complaints of slow or fast heart beat, no chest pain, no palpitations, no leg claudication or lower extremity edema  Respiratory: no complaints of shortness of shortness of breath, no JEWELL  Breasts:no complaints of breast pain, breast lump, or nipple discharge  Gastrointestinal: no complaints of abdominal pain, constipation, nausea, vomiting, or diarrhea or bloody stools  Genitourinary : no complaints of dysuria, incontinence, pelvic pain, no dysmenorrhea, vaginal discharge or abnormal vaginal bleeding and as noted in HPI  Musculoskeletal: no complaints of arthralgia, no myalgia, no joint swelling or stiffness, no limb pain or swelling    Integumentary: no complaints of skin rash or lesion, itching or dry skin  Neurological: no complaints of headache, no confusion, no numbness or tingling, no dizziness or fainting    Objective      /70   Wt 96 kg (211 lb 11 2 oz)   LMP 07/01/2009 (Exact Date)   BMI 38 72 kg/m²   General:   appears stated age, cooperative, alert normal mood and affect   Neck: normal, supple,trachea midline, no masses   Heart: regular rate and rhythm, S1, S2 normal, no murmur, click, rub or gallop   Lungs: clear to auscultation bilaterally   Breasts: normal appearance, no masses or tenderness, Inspection negative, No nipple retraction or dimpling, No nipple discharge or bleeding, No axillary or supraclavicular adenopathy, Normal to palpation without dominant masses   Abdomen: soft, non-tender, without masses or organomegaly   Vulva: normal female genitalia, Bartholin's, Urethra, [place] normal, no lesions, normal female hair distribution   Vagina: normal vagina, no discharge, exudate, lesion, or erythema   Urethra: normal   Cervix: Normal, no discharge  PAP done  Uterus: normal size, contour, position, consistency, mobility, non-tender   Adnexa: normal adnexa and no mass, fullness, tenderness   Lymphatic palpation of lymph nodes in neck, axilla, groin and/or other locations: no lymphadenopathy or masses noted   Skin normal skin turgor and no rashes     Psychiatric orientation to person, place, and time: normal  mood and affect: normal

## 2020-03-03 NOTE — PATIENT INSTRUCTIONS
Thank you for your confidence in our team    We appreciate you and welcome your feedback  If you receive a survey from us, please take a few moments to let us know how we are doing     Sincerely,   Torrey Pineda MD

## 2020-03-09 LAB
LAB AP GYN PRIMARY INTERPRETATION: NORMAL
Lab: NORMAL

## 2020-04-15 ENCOUNTER — APPOINTMENT (OUTPATIENT)
Dept: LAB | Facility: CLINIC | Age: 65
End: 2020-04-15
Payer: MEDICARE

## 2020-04-15 DIAGNOSIS — I10 BENIGN ESSENTIAL HYPERTENSION: ICD-10-CM

## 2020-04-15 DIAGNOSIS — Z76.0 MEDICATION REFILL: ICD-10-CM

## 2020-04-15 DIAGNOSIS — E78.2 MIXED HYPERLIPIDEMIA: ICD-10-CM

## 2020-04-15 DIAGNOSIS — Z11.59 NEED FOR HEPATITIS C SCREENING TEST: ICD-10-CM

## 2020-04-15 LAB
ALBUMIN SERPL BCP-MCNC: 3.7 G/DL (ref 3.5–5)
ALP SERPL-CCNC: 81 U/L (ref 46–116)
ALT SERPL W P-5'-P-CCNC: 25 U/L (ref 12–78)
ANION GAP SERPL CALCULATED.3IONS-SCNC: 3 MMOL/L (ref 4–13)
AST SERPL W P-5'-P-CCNC: 18 U/L (ref 5–45)
BILIRUB SERPL-MCNC: 0.37 MG/DL (ref 0.2–1)
BUN SERPL-MCNC: 12 MG/DL (ref 5–25)
CALCIUM SERPL-MCNC: 9.5 MG/DL (ref 8.3–10.1)
CHLORIDE SERPL-SCNC: 107 MMOL/L (ref 100–108)
CHOLEST SERPL-MCNC: 205 MG/DL (ref 50–200)
CO2 SERPL-SCNC: 28 MMOL/L (ref 21–32)
CREAT SERPL-MCNC: 0.63 MG/DL (ref 0.6–1.3)
GFR SERPL CREATININE-BSD FRML MDRD: 94 ML/MIN/1.73SQ M
GLUCOSE P FAST SERPL-MCNC: 87 MG/DL (ref 65–99)
HCV AB SER QL: NORMAL
HDLC SERPL-MCNC: 77 MG/DL
LDLC SERPL CALC-MCNC: 115 MG/DL (ref 0–100)
NONHDLC SERPL-MCNC: 128 MG/DL
POTASSIUM SERPL-SCNC: 3.9 MMOL/L (ref 3.5–5.3)
PROT SERPL-MCNC: 7.1 G/DL (ref 6.4–8.2)
SODIUM SERPL-SCNC: 138 MMOL/L (ref 136–145)
TRIGL SERPL-MCNC: 64 MG/DL

## 2020-04-15 PROCEDURE — 80053 COMPREHEN METABOLIC PANEL: CPT

## 2020-04-15 PROCEDURE — 36415 COLL VENOUS BLD VENIPUNCTURE: CPT

## 2020-04-15 PROCEDURE — 80061 LIPID PANEL: CPT

## 2020-04-15 PROCEDURE — 86803 HEPATITIS C AB TEST: CPT

## 2020-04-15 RX ORDER — AMLODIPINE BESYLATE AND BENAZEPRIL HYDROCHLORIDE 5; 20 MG/1; MG/1
1 CAPSULE ORAL DAILY
Qty: 90 CAPSULE | Refills: 1 | Status: SHIPPED | OUTPATIENT
Start: 2020-04-15 | End: 2020-10-07

## 2020-04-15 RX ORDER — HYDROCHLOROTHIAZIDE 25 MG/1
25 TABLET ORAL DAILY
Qty: 90 TABLET | Refills: 1 | Status: SHIPPED | OUTPATIENT
Start: 2020-04-15 | End: 2020-10-07

## 2020-04-20 ENCOUNTER — TELEMEDICINE (OUTPATIENT)
Dept: FAMILY MEDICINE CLINIC | Facility: CLINIC | Age: 65
End: 2020-04-20
Payer: MEDICARE

## 2020-04-20 VITALS
DIASTOLIC BLOOD PRESSURE: 76 MMHG | HEIGHT: 62 IN | WEIGHT: 204 LBS | HEART RATE: 73 BPM | BODY MASS INDEX: 37.54 KG/M2 | SYSTOLIC BLOOD PRESSURE: 128 MMHG

## 2020-04-20 DIAGNOSIS — M85.80 OSTEOPENIA, UNSPECIFIED LOCATION: ICD-10-CM

## 2020-04-20 DIAGNOSIS — I10 BENIGN ESSENTIAL HYPERTENSION: Primary | ICD-10-CM

## 2020-04-20 DIAGNOSIS — E78.00 PURE HYPERCHOLESTEROLEMIA: ICD-10-CM

## 2020-04-20 PROCEDURE — 99213 OFFICE O/P EST LOW 20 MIN: CPT | Performed by: PHYSICIAN ASSISTANT

## 2020-09-09 ENCOUNTER — TELEPHONE (OUTPATIENT)
Dept: FAMILY MEDICINE CLINIC | Facility: CLINIC | Age: 65
End: 2020-09-09

## 2020-09-09 DIAGNOSIS — M85.89 OTHER SPECIFIED DISORDERS OF BONE DENSITY AND STRUCTURE, MULTIPLE SITES: ICD-10-CM

## 2020-09-09 DIAGNOSIS — M85.80 OSTEOPENIA, UNSPECIFIED LOCATION: Primary | ICD-10-CM

## 2020-09-09 NOTE — TELEPHONE ENCOUNTER
Tammy Courser for [facility] is calling in regards to Dexa scan that was ordered  Currently script states DXA body comp analysis  Tammy Courser states usually the Dexa spine, hip and pelvis is ordered  Tammy Courser calling to verify provider did want the body comp analysis and not the spine hip and pelvis  Please advise as patient is scheduled for Sept 11    Tammy Courser can be reached at [phone]

## 2020-09-11 ENCOUNTER — HOSPITAL ENCOUNTER (OUTPATIENT)
Dept: RADIOLOGY | Facility: CLINIC | Age: 65
Discharge: HOME | End: 2020-09-11
Payer: MEDICARE

## 2020-09-11 DIAGNOSIS — M85.80 OSTEOPENIA, UNSPECIFIED LOCATION: ICD-10-CM

## 2020-09-11 DIAGNOSIS — M85.89 OTHER SPECIFIED DISORDERS OF BONE DENSITY AND STRUCTURE, MULTIPLE SITES: ICD-10-CM

## 2020-09-11 PROCEDURE — 77080 DXA BONE DENSITY AXIAL: CPT

## 2020-09-15 ENCOUNTER — HOSPITAL ENCOUNTER (OUTPATIENT)
Dept: ULTRASOUND IMAGING | Facility: HOSPITAL | Age: 65
Discharge: HOME | End: 2020-09-15
Payer: MEDICARE

## 2020-09-15 DIAGNOSIS — Z13.6 ENCOUNTER FOR ABDOMINAL AORTIC ANEURYSM (AAA) SCREENING: ICD-10-CM

## 2020-09-15 PROCEDURE — 76706 US ABDL AORTA SCREEN AAA: CPT

## 2020-10-07 DIAGNOSIS — Z76.0 MEDICATION REFILL: ICD-10-CM

## 2020-10-07 RX ORDER — AMLODIPINE BESYLATE AND BENAZEPRIL HYDROCHLORIDE 5; 20 MG/1; MG/1
CAPSULE ORAL
Qty: 90 CAPSULE | Refills: 1 | Status: SHIPPED | OUTPATIENT
Start: 2020-10-07 | End: 2021-04-09

## 2020-10-07 RX ORDER — HYDROCHLOROTHIAZIDE 25 MG/1
TABLET ORAL
Qty: 90 TABLET | Refills: 1 | Status: SHIPPED | OUTPATIENT
Start: 2020-10-07 | End: 2021-04-05

## 2020-10-10 ENCOUNTER — NURSE TRIAGE (OUTPATIENT)
Dept: OTHER | Facility: OTHER | Age: 65
End: 2020-10-10

## 2020-10-11 ENCOUNTER — OFFICE VISIT (OUTPATIENT)
Dept: URGENT CARE | Facility: CLINIC | Age: 65
End: 2020-10-11
Payer: MEDICARE

## 2020-10-11 VITALS
TEMPERATURE: 98.3 F | RESPIRATION RATE: 18 BRPM | HEART RATE: 68 BPM | SYSTOLIC BLOOD PRESSURE: 158 MMHG | OXYGEN SATURATION: 98 % | DIASTOLIC BLOOD PRESSURE: 77 MMHG

## 2020-10-11 DIAGNOSIS — N30.01 ACUTE CYSTITIS WITH HEMATURIA: Primary | ICD-10-CM

## 2020-10-11 DIAGNOSIS — R30.0 DYSURIA: ICD-10-CM

## 2020-10-11 LAB
SL AMB  POCT GLUCOSE, UA: ABNORMAL
SL AMB LEUKOCYTE ESTERASE,UA: ABNORMAL
SL AMB POCT BILIRUBIN,UA: ABNORMAL
SL AMB POCT BLOOD,UA: ABNORMAL
SL AMB POCT CLARITY,UA: ABNORMAL
SL AMB POCT COLOR,UA: YELLOW
SL AMB POCT KETONES,UA: ABNORMAL
SL AMB POCT NITRITE,UA: ABNORMAL
SL AMB POCT PH,UA: 6
SL AMB POCT SPECIFIC GRAVITY,UA: 1.01
SL AMB POCT URINE PROTEIN: 30
SL AMB POCT UROBILINOGEN: 0.2

## 2020-10-11 PROCEDURE — G0463 HOSPITAL OUTPT CLINIC VISIT: HCPCS | Performed by: NURSE PRACTITIONER

## 2020-10-11 PROCEDURE — 87086 URINE CULTURE/COLONY COUNT: CPT | Performed by: NURSE PRACTITIONER

## 2020-10-11 PROCEDURE — 81002 URINALYSIS NONAUTO W/O SCOPE: CPT | Performed by: NURSE PRACTITIONER

## 2020-10-11 PROCEDURE — 99213 OFFICE O/P EST LOW 20 MIN: CPT | Performed by: NURSE PRACTITIONER

## 2020-10-11 RX ORDER — NITROFURANTOIN 25; 75 MG/1; MG/1
100 CAPSULE ORAL 2 TIMES DAILY
Qty: 14 CAPSULE | Refills: 0 | Status: SHIPPED | OUTPATIENT
Start: 2020-10-11 | End: 2020-10-18

## 2020-10-12 LAB — BACTERIA UR CULT: NORMAL

## 2020-11-02 ENCOUNTER — TELEMEDICINE (OUTPATIENT)
Dept: FAMILY MEDICINE CLINIC | Facility: CLINIC | Age: 65
End: 2020-11-02
Payer: MEDICARE

## 2020-11-02 VITALS
WEIGHT: 205 LBS | DIASTOLIC BLOOD PRESSURE: 73 MMHG | HEART RATE: 76 BPM | SYSTOLIC BLOOD PRESSURE: 126 MMHG | BODY MASS INDEX: 37.49 KG/M2

## 2020-11-02 DIAGNOSIS — Z00.00 MEDICARE ANNUAL WELLNESS VISIT, INITIAL: Primary | ICD-10-CM

## 2020-11-02 DIAGNOSIS — I10 BENIGN ESSENTIAL HYPERTENSION: ICD-10-CM

## 2020-11-02 DIAGNOSIS — E78.00 PURE HYPERCHOLESTEROLEMIA: ICD-10-CM

## 2020-11-02 DIAGNOSIS — Z23 ENCOUNTER FOR IMMUNIZATION: ICD-10-CM

## 2020-11-02 DIAGNOSIS — Z12.31 BREAST CANCER SCREENING BY MAMMOGRAM: ICD-10-CM

## 2020-11-02 PROCEDURE — 99213 OFFICE O/P EST LOW 20 MIN: CPT | Performed by: PHYSICIAN ASSISTANT

## 2020-11-02 PROCEDURE — G0439 PPPS, SUBSEQ VISIT: HCPCS | Performed by: PHYSICIAN ASSISTANT

## 2020-11-03 ENCOUNTER — CLINICAL SUPPORT (OUTPATIENT)
Dept: FAMILY MEDICINE CLINIC | Facility: CLINIC | Age: 65
End: 2020-11-03
Payer: MEDICARE

## 2020-11-03 DIAGNOSIS — Z23 FLU VACCINE NEED: Primary | ICD-10-CM

## 2020-11-03 PROCEDURE — 90662 IIV NO PRSV INCREASED AG IM: CPT

## 2020-11-03 PROCEDURE — G0008 ADMIN INFLUENZA VIRUS VAC: HCPCS

## 2020-12-14 DIAGNOSIS — E78.00 PURE HYPERCHOLESTEROLEMIA: Primary | ICD-10-CM

## 2020-12-14 RX ORDER — PRAVASTATIN SODIUM 10 MG
10 TABLET ORAL DAILY
Qty: 90 TABLET | Refills: 1 | Status: SHIPPED | OUTPATIENT
Start: 2020-12-14 | End: 2021-06-09

## 2021-03-10 DIAGNOSIS — Z23 ENCOUNTER FOR IMMUNIZATION: ICD-10-CM

## 2021-03-16 ENCOUNTER — HOSPITAL ENCOUNTER (OUTPATIENT)
Dept: MAMMOGRAPHY | Facility: HOSPITAL | Age: 66
Discharge: HOME | End: 2021-03-16
Payer: MEDICARE

## 2021-03-16 VITALS — WEIGHT: 206 LBS | HEIGHT: 62 IN | BODY MASS INDEX: 37.91 KG/M2

## 2021-03-16 DIAGNOSIS — Z12.31 BREAST CANCER SCREENING BY MAMMOGRAM: ICD-10-CM

## 2021-03-16 PROCEDURE — 77063 BREAST TOMOSYNTHESIS BI: CPT

## 2021-03-16 PROCEDURE — 77067 SCR MAMMO BI INCL CAD: CPT

## 2021-03-22 ENCOUNTER — IMMUNIZATIONS (OUTPATIENT)
Dept: FAMILY MEDICINE CLINIC | Facility: HOSPITAL | Age: 66
End: 2021-03-22

## 2021-03-22 DIAGNOSIS — Z23 ENCOUNTER FOR IMMUNIZATION: Primary | ICD-10-CM

## 2021-03-22 PROCEDURE — 0001A SARS-COV-2 / COVID-19 MRNA VACCINE (PFIZER-BIONTECH) 30 MCG: CPT

## 2021-03-22 PROCEDURE — 91300 SARS-COV-2 / COVID-19 MRNA VACCINE (PFIZER-BIONTECH) 30 MCG: CPT

## 2021-04-04 DIAGNOSIS — Z76.0 MEDICATION REFILL: ICD-10-CM

## 2021-04-05 RX ORDER — HYDROCHLOROTHIAZIDE 25 MG/1
TABLET ORAL
Qty: 90 TABLET | Refills: 1 | Status: SHIPPED | OUTPATIENT
Start: 2021-04-05 | End: 2021-09-28

## 2021-04-09 DIAGNOSIS — Z76.0 MEDICATION REFILL: ICD-10-CM

## 2021-04-09 RX ORDER — AMLODIPINE BESYLATE AND BENAZEPRIL HYDROCHLORIDE 5; 20 MG/1; MG/1
CAPSULE ORAL
Qty: 90 CAPSULE | Refills: 1 | Status: SHIPPED | OUTPATIENT
Start: 2021-04-09 | End: 2021-10-14

## 2021-04-14 ENCOUNTER — IMMUNIZATIONS (OUTPATIENT)
Dept: FAMILY MEDICINE CLINIC | Facility: HOSPITAL | Age: 66
End: 2021-04-14

## 2021-04-14 DIAGNOSIS — Z23 ENCOUNTER FOR IMMUNIZATION: Primary | ICD-10-CM

## 2021-04-14 PROCEDURE — 0002A SARS-COV-2 / COVID-19 MRNA VACCINE (PFIZER-BIONTECH) 30 MCG: CPT

## 2021-04-14 PROCEDURE — 91300 SARS-COV-2 / COVID-19 MRNA VACCINE (PFIZER-BIONTECH) 30 MCG: CPT

## 2021-04-29 ENCOUNTER — APPOINTMENT (OUTPATIENT)
Dept: LAB | Facility: CLINIC | Age: 66
End: 2021-04-29
Payer: MEDICARE

## 2021-04-29 DIAGNOSIS — I10 BENIGN ESSENTIAL HYPERTENSION: ICD-10-CM

## 2021-04-29 DIAGNOSIS — E78.00 PURE HYPERCHOLESTEROLEMIA: ICD-10-CM

## 2021-04-29 LAB
ALBUMIN SERPL BCP-MCNC: 3.6 G/DL (ref 3.5–5)
ALP SERPL-CCNC: 80 U/L (ref 46–116)
ALT SERPL W P-5'-P-CCNC: 23 U/L (ref 12–78)
ANION GAP SERPL CALCULATED.3IONS-SCNC: 5 MMOL/L (ref 4–13)
AST SERPL W P-5'-P-CCNC: 18 U/L (ref 5–45)
BILIRUB SERPL-MCNC: 0.44 MG/DL (ref 0.2–1)
BUN SERPL-MCNC: 13 MG/DL (ref 5–25)
CALCIUM SERPL-MCNC: 9.6 MG/DL (ref 8.3–10.1)
CHLORIDE SERPL-SCNC: 108 MMOL/L (ref 100–108)
CHOLEST SERPL-MCNC: 196 MG/DL (ref 50–200)
CO2 SERPL-SCNC: 28 MMOL/L (ref 21–32)
CREAT SERPL-MCNC: 0.62 MG/DL (ref 0.6–1.3)
GFR SERPL CREATININE-BSD FRML MDRD: 94 ML/MIN/1.73SQ M
GLUCOSE P FAST SERPL-MCNC: 90 MG/DL (ref 65–99)
HDLC SERPL-MCNC: 71 MG/DL
LDLC SERPL CALC-MCNC: 112 MG/DL (ref 0–100)
NONHDLC SERPL-MCNC: 125 MG/DL
POTASSIUM SERPL-SCNC: 3.8 MMOL/L (ref 3.5–5.3)
PROT SERPL-MCNC: 7.1 G/DL (ref 6.4–8.2)
SODIUM SERPL-SCNC: 141 MMOL/L (ref 136–145)
TRIGL SERPL-MCNC: 65 MG/DL

## 2021-04-29 PROCEDURE — 36415 COLL VENOUS BLD VENIPUNCTURE: CPT

## 2021-04-29 PROCEDURE — 80053 COMPREHEN METABOLIC PANEL: CPT

## 2021-04-29 PROCEDURE — 80061 LIPID PANEL: CPT

## 2021-05-04 PROBLEM — E66.01 MORBID OBESITY (HCC): Status: ACTIVE | Noted: 2021-05-04

## 2021-05-10 ENCOUNTER — OFFICE VISIT (OUTPATIENT)
Dept: FAMILY MEDICINE CLINIC | Facility: CLINIC | Age: 66
End: 2021-05-10
Payer: MEDICARE

## 2021-05-10 VITALS
SYSTOLIC BLOOD PRESSURE: 140 MMHG | HEART RATE: 62 BPM | BODY MASS INDEX: 38.83 KG/M2 | WEIGHT: 211 LBS | RESPIRATION RATE: 17 BRPM | DIASTOLIC BLOOD PRESSURE: 80 MMHG | OXYGEN SATURATION: 96 % | TEMPERATURE: 98.2 F | HEIGHT: 62 IN

## 2021-05-10 DIAGNOSIS — D03.9 MELANOMA IN SITU, UNSPECIFIED SITE (HCC): ICD-10-CM

## 2021-05-10 DIAGNOSIS — Z23 PNEUMOCOCCAL VACCINE ADMINISTERED: ICD-10-CM

## 2021-05-10 DIAGNOSIS — E66.01 MORBID OBESITY (HCC): ICD-10-CM

## 2021-05-10 DIAGNOSIS — H61.23 BILATERAL IMPACTED CERUMEN: ICD-10-CM

## 2021-05-10 DIAGNOSIS — I10 BENIGN ESSENTIAL HYPERTENSION: Primary | ICD-10-CM

## 2021-05-10 DIAGNOSIS — E78.2 MIXED HYPERLIPIDEMIA: ICD-10-CM

## 2021-05-10 PROCEDURE — 69209 REMOVE IMPACTED EAR WAX UNI: CPT | Performed by: PHYSICIAN ASSISTANT

## 2021-05-10 PROCEDURE — 99214 OFFICE O/P EST MOD 30 MIN: CPT | Performed by: PHYSICIAN ASSISTANT

## 2021-05-10 PROCEDURE — G0009 ADMIN PNEUMOCOCCAL VACCINE: HCPCS

## 2021-05-10 PROCEDURE — 90732 PPSV23 VACC 2 YRS+ SUBQ/IM: CPT

## 2021-05-10 NOTE — PROGRESS NOTES
Routine Follow-up    Kimberlee Estes 78 y o  female   Date:  5/10/2021      Assessment and Plan:    Maria Antonia Cardoza was seen today for follow-up  Diagnoses and all orders for this visit:    Benign essential hypertension  - monitor BP at home   - discussed improved lifestyle modifications   - continue without changes     Morbid obesity ([facility] [address] )  - counseling     Melanoma in situ, unspecified site ([facility] )  - follows with derm     Pneumococcal vaccine administered  -     PNEUMOCOCCAL POLYSACCHARIDE VACCINE 23-VALENT =>1YO SQ IM    Mixed hyperlipidemia  - well controlled on statin     Bilateral impacted cerumen  -     Ear cerumen removal        BMI Counseling: Body mass index is 38 59 kg/m²  The BMI is above normal  Nutrition recommendations include decreasing portion sizes and reducing intake of cholesterol  Exercise recommendations include exercising 3-5 times per week  HPI:  Chief Complaint   Patient presents with    Follow-up     Review blood work results      HPI   Patient is a 79 yo female who presents for routine follow up  She is doing well  She wishes to lose some weight and get more active again  She also admits that she fell away from tracking her BP at home, on the higher end of normal today  Her recent labs were stable  She has follow up with derm in Aug, [facility]  She does feel her ears are clogged, sometimes has to pop open in the mornings  ROS: Review of Systems   Constitutional: Negative  HENT: Negative for ear discharge and ear pain  Respiratory: Negative  Cardiovascular: Negative  Gastrointestinal: Negative  Genitourinary: Negative  Neurological: Negative  Psychiatric/Behavioral: Negative  History reviewed  No pertinent past medical history    Patient Active Problem List   Diagnosis    Abnormal mammogram of left breast    Benign essential hypertension    FH: CAD (coronary artery disease)    [facility] ([facility] )    Osteopenia    Pure hypercholesterolemia  Vitamin D deficiency    Morbid obesity ([facility] [address] )       Past Surgical History:   Procedure Laterality Date    BREAST BIOPSY Left 2017    benign    BREAST CYST EXCISION Left     benign    SALPINGOOPHORECTOMY  1973    WISDOM TOOTH EXTRACTION         Social History     Socioeconomic History    Marital status: /Civil Union     Spouse name: None    Number of children: None    Years of education: None    Highest education level: None   Occupational History    None   Social Needs    Financial resource strain: None    Food insecurity     Worry: None     Inability: None    Transportation needs     Medical: None     Non-medical: None   Tobacco Use    Smoking status: Former Smoker    Smokeless tobacco: Never Used   Substance and Sexual Activity    Alcohol use: Yes     Frequency: 2-3 times a week     Comment: couple glasses of wine a week    Drug use: Never    Sexual activity: Yes     Partners: Male     Birth control/protection: Post-menopausal   Lifestyle    Physical activity     Days per week: None     Minutes per session: None    Stress: None   Relationships    Social connections     Talks on phone: None     Gets together: None     Attends Alevism service: None     Active member of club or organization: None     Attends meetings of clubs or organizations: None     Relationship status: None    Intimate partner violence     Fear of current or ex partner: None     Emotionally abused: None     Physically abused: None     Forced sexual activity: None   Other Topics Concern    None   Social History Narrative    None       Family History   Problem Relation Age of Onset    Hypertension Mother     Cancer Father         lung    Heart disease Father     Arthritis Father     Asthma Father     No Known Problems Daughter     No Known Problems Maternal Grandmother     No Known Problems Maternal Grandfather     No Known Problems Paternal Grandmother     No Known Problems Paternal Grandfather     No Known Problems Maternal Aunt     No Known Problems Paternal Aunt     No Known Problems Paternal Aunt        Allergies   Allergen Reactions    Clindamycin Diarrhea and GI Intolerance     Other reaction(s): Weakness   Severe stomach pains, muscle aches  Abdominal pains  weakness      Rosuvastatin Myalgia     Myalgias  Leg cramps      Simvastatin      Myalgias  Muscle spasms           Current Outpatient Medications:     amLODIPine-benazepril (LOTREL 5-20) 5-20 MG per capsule, take 1 capsule by mouth once daily, Disp: 90 capsule, Rfl: 1    aspirin 81 MG tablet, Take 81 mg by mouth daily, Disp: , Rfl:     Calcium Carb-Cholecalciferol (CALCIUM-VITAMIN D) 600-400 MG-UNIT TABS, Take 1 tablet by mouth daily, Disp: , Rfl:     cholecalciferol (VITAMIN D3) 1,000 units tablet, Take 1,000 Units by mouth daily, Disp: , Rfl:     Coenzyme Q10 (CO Q 10) 100 MG CAPS, Take 1 capsule by mouth daily, Disp: , Rfl:     Flaxseed, Linseed, (FLAXSEED OIL) 1000 MG CAPS, Take 1 capsule by mouth daily, Disp: , Rfl:     hydrochlorothiazide (HYDRODIURIL) 25 mg tablet, take 1 tablet by mouth once daily, Disp: 90 tablet, Rfl: 1    Multiple Vitamin (MULTIVITAMIN) tablet, Take 1 tablet by mouth daily, Disp: , Rfl:     pravastatin (PRAVACHOL) 10 mg tablet, Take 1 tablet (10 mg total) by mouth daily, Disp: 90 tablet, Rfl: 1      Physical Exam:  /80 (BP Location: Left arm, Patient Position: Sitting)   Pulse 62   Temp 98 2 °F (36 8 °C)   Resp 17   Ht 5' 2" (1 575 m)   Wt 95 7 kg (211 lb)   LMP 07/01/2009 (Exact Date)   SpO2 96%   BMI 38 59 kg/m²     Physical Exam  Constitutional:       General: She is not in acute distress  Appearance: Normal appearance  She is obese  HENT:      Head: Normocephalic and atraumatic  Right Ear: Ear canal and external ear normal  There is impacted cerumen  Left Ear: Ear canal and external ear normal  There is impacted cerumen        Ears:      Comments: TM nl s/p irrigation     Nose: Nose normal       Mouth/Throat:      Mouth: Mucous membranes are moist       Pharynx: Oropharynx is clear  Eyes:      Conjunctiva/sclera: Conjunctivae normal       Pupils: Pupils are equal, round, and reactive to light  Cardiovascular:      Rate and Rhythm: Normal rate and regular rhythm  Pulses:           Posterior tibial pulses are 2+ on the right side and 2+ on the left side  Heart sounds: No murmur  Pulmonary:      Effort: Pulmonary effort is normal  No respiratory distress  Breath sounds: Normal breath sounds  No wheezing  Musculoskeletal:         General: No deformity  Skin:     General: Skin is warm  Coloration: Skin is not pale  Findings: No rash  Neurological:      General: No focal deficit present  Mental Status: She is alert and oriented to person, place, and time  Cranial Nerves: No cranial nerve deficit  Psychiatric:         Mood and Affect: Mood normal          Behavior: Behavior normal              Labs:  No results found for: WBC, HGB, HCT, MCV, PLT  Lab Results   Component Value Date    K 3 8 04/29/2021     04/29/2021    CO2 28 04/29/2021    BUN 13 04/29/2021    CREATININE 0 62 04/29/2021    GLUF 90 04/29/2021    CALCIUM 9 6 04/29/2021    AST 18 04/29/2021    ALT 23 04/29/2021    ALKPHOS 80 04/29/2021    EGFR 94 04/29/2021       Ear cerumen removal    Date/Time: 5/10/2021 9:14 AM  Performed by: Sudhakar Schuster PA-C  Authorized by: Sudhakar Schuster PA-C   Universal Protocol:  Consent: Verbal consent obtained  Consent given by: patient      Patient location:  Clinic  Procedure details:     Local anesthetic:  None    Location:  R ear and L ear    Procedure type: irrigation only      Approach:  Natural orifice  Post-procedure details:     Complication:  None    Hearing quality:  Improved    Patient tolerance of procedure:   Tolerated well, no immediate complications

## 2021-06-07 DIAGNOSIS — E78.00 PURE HYPERCHOLESTEROLEMIA: ICD-10-CM

## 2021-06-09 RX ORDER — PRAVASTATIN SODIUM 10 MG
TABLET ORAL
Qty: 90 TABLET | Refills: 1 | Status: SHIPPED | OUTPATIENT
Start: 2021-06-09 | End: 2021-12-06 | Stop reason: SDUPTHER

## 2021-06-12 DIAGNOSIS — E78.00 PURE HYPERCHOLESTEROLEMIA: ICD-10-CM

## 2021-06-12 RX ORDER — PRAVASTATIN SODIUM 10 MG
10 TABLET ORAL DAILY
Qty: 90 TABLET | Refills: 0 | Status: CANCELLED | OUTPATIENT
Start: 2021-06-12

## 2021-06-14 NOTE — TELEPHONE ENCOUNTER
Last refill date:  6/9/202  Pravastatin    Refilled for 90 days  Refill not appropriate at this time

## 2021-09-28 DIAGNOSIS — Z76.0 MEDICATION REFILL: ICD-10-CM

## 2021-09-28 RX ORDER — HYDROCHLOROTHIAZIDE 25 MG/1
TABLET ORAL
Qty: 90 TABLET | Refills: 1 | Status: SHIPPED | OUTPATIENT
Start: 2021-09-28 | End: 2022-03-22

## 2021-11-10 ENCOUNTER — OFFICE VISIT (OUTPATIENT)
Dept: FAMILY MEDICINE CLINIC | Facility: CLINIC | Age: 66
End: 2021-11-10
Payer: MEDICARE

## 2021-11-10 VITALS
OXYGEN SATURATION: 98 % | DIASTOLIC BLOOD PRESSURE: 78 MMHG | WEIGHT: 215 LBS | BODY MASS INDEX: 39.56 KG/M2 | TEMPERATURE: 97.8 F | HEIGHT: 62 IN | SYSTOLIC BLOOD PRESSURE: 138 MMHG | HEART RATE: 66 BPM

## 2021-11-10 DIAGNOSIS — Z23 FLU VACCINE NEED: ICD-10-CM

## 2021-11-10 DIAGNOSIS — E78.00 PURE HYPERCHOLESTEROLEMIA: ICD-10-CM

## 2021-11-10 DIAGNOSIS — I10 BENIGN ESSENTIAL HYPERTENSION: ICD-10-CM

## 2021-11-10 DIAGNOSIS — Z00.00 MEDICARE ANNUAL WELLNESS VISIT, SUBSEQUENT: Primary | ICD-10-CM

## 2021-11-10 DIAGNOSIS — Z78.0 ASYMPTOMATIC MENOPAUSAL STATE: ICD-10-CM

## 2021-11-10 DIAGNOSIS — Z12.31 BREAST CANCER SCREENING BY MAMMOGRAM: ICD-10-CM

## 2021-11-10 DIAGNOSIS — M85.80 OSTEOPENIA, UNSPECIFIED LOCATION: ICD-10-CM

## 2021-11-10 PROCEDURE — 90662 IIV NO PRSV INCREASED AG IM: CPT

## 2021-11-10 PROCEDURE — G0438 PPPS, INITIAL VISIT: HCPCS | Performed by: PHYSICIAN ASSISTANT

## 2021-11-10 PROCEDURE — G0008 ADMIN INFLUENZA VIRUS VAC: HCPCS

## 2021-11-10 PROCEDURE — 1123F ACP DISCUSS/DSCN MKR DOCD: CPT | Performed by: PHYSICIAN ASSISTANT

## 2021-11-10 PROCEDURE — 99213 OFFICE O/P EST LOW 20 MIN: CPT | Performed by: PHYSICIAN ASSISTANT

## 2021-12-06 DIAGNOSIS — E78.00 PURE HYPERCHOLESTEROLEMIA: ICD-10-CM

## 2021-12-06 RX ORDER — PRAVASTATIN SODIUM 10 MG
10 TABLET ORAL DAILY
Qty: 90 TABLET | Refills: 1 | Status: SHIPPED | OUTPATIENT
Start: 2021-12-06 | End: 2021-12-07

## 2022-03-22 DIAGNOSIS — Z76.0 MEDICATION REFILL: ICD-10-CM

## 2022-03-22 RX ORDER — HYDROCHLOROTHIAZIDE 25 MG/1
TABLET ORAL
Qty: 90 TABLET | Refills: 1 | Status: SHIPPED | OUTPATIENT
Start: 2022-03-22

## 2022-03-28 ENCOUNTER — HOSPITAL ENCOUNTER (OUTPATIENT)
Dept: MAMMOGRAPHY | Facility: HOSPITAL | Age: 67
Discharge: HOME | End: 2022-03-28
Payer: MEDICARE

## 2022-03-28 VITALS — WEIGHT: 216 LBS | HEIGHT: 62 IN | BODY MASS INDEX: 39.75 KG/M2

## 2022-03-28 DIAGNOSIS — Z12.31 BREAST CANCER SCREENING BY MAMMOGRAM: ICD-10-CM

## 2022-03-28 PROCEDURE — 77063 BREAST TOMOSYNTHESIS BI: CPT

## 2022-03-28 PROCEDURE — 77067 SCR MAMMO BI INCL CAD: CPT

## 2022-04-11 DIAGNOSIS — Z76.0 MEDICATION REFILL: ICD-10-CM

## 2022-04-11 RX ORDER — AMLODIPINE BESYLATE AND BENAZEPRIL HYDROCHLORIDE 5; 20 MG/1; MG/1
CAPSULE ORAL
Qty: 90 CAPSULE | Refills: 1 | Status: SHIPPED | OUTPATIENT
Start: 2022-04-11

## 2022-05-11 ENCOUNTER — APPOINTMENT (OUTPATIENT)
Dept: LAB | Facility: CLINIC | Age: 67
End: 2022-05-11
Payer: MEDICARE

## 2022-05-11 DIAGNOSIS — I10 BENIGN ESSENTIAL HYPERTENSION: ICD-10-CM

## 2022-05-11 DIAGNOSIS — E78.00 PURE HYPERCHOLESTEROLEMIA: ICD-10-CM

## 2022-05-11 LAB
ALBUMIN SERPL BCP-MCNC: 3.6 G/DL (ref 3.5–5)
ALP SERPL-CCNC: 86 U/L (ref 46–116)
ALT SERPL W P-5'-P-CCNC: 26 U/L (ref 12–78)
ANION GAP SERPL CALCULATED.3IONS-SCNC: 2 MMOL/L (ref 4–13)
AST SERPL W P-5'-P-CCNC: 18 U/L (ref 5–45)
BASOPHILS # BLD AUTO: 0.06 THOUSANDS/ΜL (ref 0–0.1)
BASOPHILS NFR BLD AUTO: 1 % (ref 0–1)
BILIRUB SERPL-MCNC: 0.43 MG/DL (ref 0.2–1)
BUN SERPL-MCNC: 13 MG/DL (ref 5–25)
CALCIUM SERPL-MCNC: 9.8 MG/DL (ref 8.3–10.1)
CHLORIDE SERPL-SCNC: 107 MMOL/L (ref 100–108)
CHOLEST SERPL-MCNC: 203 MG/DL
CO2 SERPL-SCNC: 31 MMOL/L (ref 21–32)
CREAT SERPL-MCNC: 0.68 MG/DL (ref 0.6–1.3)
EOSINOPHIL # BLD AUTO: 0.23 THOUSAND/ΜL (ref 0–0.61)
EOSINOPHIL NFR BLD AUTO: 4 % (ref 0–6)
ERYTHROCYTE [DISTWIDTH] IN BLOOD BY AUTOMATED COUNT: 13.9 % (ref 11.6–15.1)
GFR SERPL CREATININE-BSD FRML MDRD: 90 ML/MIN/1.73SQ M
GLUCOSE P FAST SERPL-MCNC: 89 MG/DL (ref 65–99)
HCT VFR BLD AUTO: 40.4 % (ref 34.8–46.1)
HDLC SERPL-MCNC: 70 MG/DL
HGB BLD-MCNC: 13.3 G/DL (ref 11.5–15.4)
IMM GRANULOCYTES # BLD AUTO: 0.02 THOUSAND/UL (ref 0–0.2)
IMM GRANULOCYTES NFR BLD AUTO: 0 % (ref 0–2)
LDLC SERPL CALC-MCNC: 116 MG/DL (ref 0–100)
LYMPHOCYTES # BLD AUTO: 1.79 THOUSANDS/ΜL (ref 0.6–4.47)
LYMPHOCYTES NFR BLD AUTO: 29 % (ref 14–44)
MCH RBC QN AUTO: 28.8 PG (ref 26.8–34.3)
MCHC RBC AUTO-ENTMCNC: 32.9 G/DL (ref 31.4–37.4)
MCV RBC AUTO: 87 FL (ref 82–98)
MONOCYTES # BLD AUTO: 0.42 THOUSAND/ΜL (ref 0.17–1.22)
MONOCYTES NFR BLD AUTO: 7 % (ref 4–12)
NEUTROPHILS # BLD AUTO: 3.58 THOUSANDS/ΜL (ref 1.85–7.62)
NEUTS SEG NFR BLD AUTO: 59 % (ref 43–75)
NONHDLC SERPL-MCNC: 133 MG/DL
NRBC BLD AUTO-RTO: 0 /100 WBCS
PLATELET # BLD AUTO: 303 THOUSANDS/UL (ref 149–390)
PMV BLD AUTO: 10.3 FL (ref 8.9–12.7)
POTASSIUM SERPL-SCNC: 3.8 MMOL/L (ref 3.5–5.3)
PROT SERPL-MCNC: 7.4 G/DL (ref 6.4–8.2)
RBC # BLD AUTO: 4.62 MILLION/UL (ref 3.81–5.12)
SODIUM SERPL-SCNC: 140 MMOL/L (ref 136–145)
TRIGL SERPL-MCNC: 84 MG/DL
WBC # BLD AUTO: 6.1 THOUSAND/UL (ref 4.31–10.16)

## 2022-05-11 PROCEDURE — 80053 COMPREHEN METABOLIC PANEL: CPT

## 2022-05-11 PROCEDURE — 36415 COLL VENOUS BLD VENIPUNCTURE: CPT

## 2022-05-11 PROCEDURE — 80061 LIPID PANEL: CPT

## 2022-05-11 PROCEDURE — 85025 COMPLETE CBC W/AUTO DIFF WBC: CPT

## 2022-05-18 ENCOUNTER — OFFICE VISIT (OUTPATIENT)
Dept: FAMILY MEDICINE CLINIC | Facility: CLINIC | Age: 67
End: 2022-05-18
Payer: MEDICARE

## 2022-05-18 VITALS
SYSTOLIC BLOOD PRESSURE: 144 MMHG | OXYGEN SATURATION: 97 % | HEART RATE: 65 BPM | BODY MASS INDEX: 38.8 KG/M2 | TEMPERATURE: 97.2 F | HEIGHT: 63 IN | DIASTOLIC BLOOD PRESSURE: 82 MMHG | WEIGHT: 219 LBS

## 2022-05-18 DIAGNOSIS — E66.01 MORBID OBESITY (HCC): ICD-10-CM

## 2022-05-18 DIAGNOSIS — D03.9 MELANOMA IN SITU, UNSPECIFIED SITE (HCC): ICD-10-CM

## 2022-05-18 DIAGNOSIS — E78.2 MIXED HYPERLIPIDEMIA: ICD-10-CM

## 2022-05-18 DIAGNOSIS — M85.88 OTHER SPECIFIED DISORDERS OF BONE DENSITY AND STRUCTURE, OTHER SITE: ICD-10-CM

## 2022-05-18 DIAGNOSIS — E78.00 PURE HYPERCHOLESTEROLEMIA: ICD-10-CM

## 2022-05-18 DIAGNOSIS — I10 BENIGN ESSENTIAL HYPERTENSION: Primary | ICD-10-CM

## 2022-05-18 DIAGNOSIS — M85.80 OSTEOPENIA, UNSPECIFIED LOCATION: ICD-10-CM

## 2022-05-18 PROCEDURE — 99214 OFFICE O/P EST MOD 30 MIN: CPT | Performed by: PHYSICIAN ASSISTANT

## 2022-05-18 RX ORDER — PRAVASTATIN SODIUM 10 MG
10 TABLET ORAL DAILY
Qty: 90 TABLET | Refills: 1 | Status: SHIPPED | OUTPATIENT
Start: 2022-05-18

## 2022-05-18 NOTE — PROGRESS NOTES
Routine Follow-up    Royce Russell 75 y o  female   Date:  5/18/2022      Assessment and Plan:    Hailee Finley was seen today for follow-up  Diagnoses and all orders for this visit:    Benign essential hypertension  Comments:  monitor BP twice weekly and call back if Bp above goal, no changes made today as home BP readings have been stable     Morbid obesity (HCC)    Melanoma in situ, unspecified site [facility])  Comments:  follows regularly with derm     Mixed hyperlipidemia  Comments:  continue statin, monitor labs yearly     Osteopenia, unspecified location  Comments:  due for repeat in sept   Orders:  -     DXA bone density spine hip and pelvis; Future    Pure hypercholesterolemia  -     pravastatin (PRAVACHOL) 10 mg tablet; Take 1 tablet (10 mg total) by mouth in the morning  Other specified disorders of bone density and structure, other site   -     DXA bone density spine hip and pelvis; Future      BMI Counseling: Body mass index is 39 42 kg/m²  The BMI is above normal  Nutrition recommendations include reducing intake of saturated and trans fat and reducing intake of cholesterol  Exercise recommendations include exercising 3-5 times per week  Rationale for BMI follow-up plan is due to patient being overweight or obese  Depression Screening and Follow-up Plan: Patient was screened for depression during today's encounter  They screened negative with a PHQ-2 score of 0  HPI:  Chief Complaint   Patient presents with    Follow-up     6 month check  No concerns today  No refills needed today  HPI   Patient is a 74 yo female who presents for routine follow up  No concerns  She is compliant with medications  Her Bp is slightly above goal today, feels she gets white coat  She typically checks Bp at home and has always been at goal, has not checked recently but will start  She is trying to follow up heart healthy diet  However, she is hoping to become more physically active     She goes for derm every 6 months  She is UTD on mammogram  She will be due for dexa in sept  Her labs were stable and reviewed today  ROS: Review of Systems   Constitutional: Negative  Respiratory: Negative  Cardiovascular: Negative  Gastrointestinal: Negative  Genitourinary: Negative  Neurological: Negative  History reviewed  No pertinent past medical history    Patient Active Problem List   Diagnosis    Abnormal mammogram of left breast    Benign essential hypertension    FH: CAD (coronary artery disease)    Melanoma in situ ([facility] [address] )    Osteopenia    Pure hypercholesterolemia    Vitamin D deficiency    Morbid obesity ([facility] [address] )       Past Surgical History:   Procedure Laterality Date    BREAST BIOPSY Left 2017    benign    BREAST CYST EXCISION Left     benign    MOHS SURGERY      SALPINGOOPHORECTOMY  1973    WISDOM TOOTH EXTRACTION         Social History     Socioeconomic History    Marital status: /Civil Union     Spouse name: None    Number of children: None    Years of education: None    Highest education level: None   Occupational History    None   Tobacco Use    Smoking status: Former Smoker    Smokeless tobacco: Never Used   Vaping Use    Vaping Use: Never used   Substance and Sexual Activity    Alcohol use: Yes     Comment: couple glasses of wine a week    Drug use: Never    Sexual activity: Yes     Partners: Male     Birth control/protection: Post-menopausal   Other Topics Concern    None   Social History Narrative    None     Social Determinants of Health     Financial Resource Strain: Not on file   Food Insecurity: Not on file   Transportation Needs: Not on file   Physical Activity: Not on file   Stress: Not on file   Social Connections: Not on file   Intimate Partner Violence: Not on file   Housing Stability: Not on file       Family History   Problem Relation Age of Onset    Hypertension Mother     Cancer Father         lung    Heart disease Father     Arthritis Father  Asthma Father     No Known Problems Daughter     No Known Problems Maternal Grandmother     No Known Problems Maternal Grandfather     No Known Problems Paternal Grandmother     No Known Problems Paternal Grandfather     No Known Problems Maternal Aunt     No Known Problems Paternal Aunt     No Known Problems Paternal Aunt        Allergies   Allergen Reactions    Clindamycin Diarrhea and GI Intolerance     Other reaction(s): Weakness   Severe stomach pains, muscle aches  Abdominal pains  weakness      Rosuvastatin Myalgia     Myalgias  Leg cramps      Simvastatin      Myalgias  Muscle spasms           Current Outpatient Medications:     amLODIPine-benazepril (LOTREL 5-20) 5-20 MG per capsule, take 1 capsule by mouth once daily, Disp: 90 capsule, Rfl: 1    aspirin 81 MG tablet, Take 81 mg by mouth daily, Disp: , Rfl:     Calcium Carb-Cholecalciferol (CALCIUM-VITAMIN D) 600-400 MG-UNIT TABS, Take 1 tablet by mouth daily, Disp: , Rfl:     cholecalciferol (VITAMIN D3) 1,000 units tablet, Take 1,000 Units by mouth daily, Disp: , Rfl:     Coenzyme Q10 (CO Q 10) 100 MG CAPS, Take 1 capsule by mouth daily, Disp: , Rfl:     Flaxseed, Linseed, (FLAXSEED OIL) 1000 MG CAPS, Take 1 capsule by mouth daily, Disp: , Rfl:     hydrochlorothiazide (HYDRODIURIL) 25 mg tablet, take 1 tablet by mouth once daily, Disp: 90 tablet, Rfl: 1    Multiple Vitamin (MULTIVITAMIN) tablet, Take 1 tablet by mouth daily, Disp: , Rfl:     pravastatin (PRAVACHOL) 10 mg tablet, Take 1 tablet (10 mg total) by mouth in the morning , Disp: 90 tablet, Rfl: 1      Physical Exam:  /82 (BP Location: Left arm, Patient Position: Sitting, Cuff Size: Large)   Pulse 65   Temp (!) 97 2 °F (36 2 °C) (Tympanic)   Ht 5' 2 5" (1 588 m)   Wt 99 3 kg (219 lb)   LMP 07/01/2009 (Exact Date)   SpO2 97%   BMI 39 42 kg/m²     Physical Exam  Constitutional:       General: She is not in acute distress  Appearance: Normal appearance  HENT:      Head: Normocephalic and atraumatic  Right Ear: Ear canal and external ear normal       Left Ear: Tympanic membrane, ear canal and external ear normal       Mouth/Throat:      Mouth: Mucous membranes are moist       Pharynx: Oropharynx is clear  Eyes:      Conjunctiva/sclera: Conjunctivae normal       Pupils: Pupils are equal, round, and reactive to light  Cardiovascular:      Rate and Rhythm: Normal rate and regular rhythm  Heart sounds: No murmur heard  Pulmonary:      Effort: Pulmonary effort is normal  No respiratory distress  Breath sounds: Normal breath sounds  No wheezing  Musculoskeletal:         General: No deformity  Skin:     General: Skin is warm and dry  Coloration: Skin is not pale  Neurological:      General: No focal deficit present  Mental Status: She is alert and oriented to person, place, and time     Psychiatric:         Mood and Affect: Mood normal          Behavior: Behavior normal            Labs:  Lab Results   Component Value Date    WBC 6 10 05/11/2022    HGB 13 3 05/11/2022    HCT 40 4 05/11/2022    MCV 87 05/11/2022     05/11/2022     Lab Results   Component Value Date    K 3 8 05/11/2022     05/11/2022    CO2 31 05/11/2022    BUN 13 05/11/2022    CREATININE 0 68 05/11/2022    GLUF 89 05/11/2022    CALCIUM 9 8 05/11/2022    AST 18 05/11/2022    ALT 26 05/11/2022    ALKPHOS 86 05/11/2022    EGFR 90 05/11/2022

## 2022-09-12 ENCOUNTER — HOSPITAL ENCOUNTER (OUTPATIENT)
Dept: RADIOLOGY | Facility: CLINIC | Age: 67
Discharge: HOME | End: 2022-09-12
Payer: MEDICARE

## 2022-09-12 DIAGNOSIS — M85.80 OSTEOPENIA, UNSPECIFIED LOCATION: ICD-10-CM

## 2022-09-12 DIAGNOSIS — M85.88 OTHER SPECIFIED DISORDERS OF BONE DENSITY AND STRUCTURE, OTHER SITE: ICD-10-CM

## 2022-09-12 PROCEDURE — 77080 DXA BONE DENSITY AXIAL: CPT

## 2022-09-23 DIAGNOSIS — Z76.0 MEDICATION REFILL: ICD-10-CM

## 2022-09-23 RX ORDER — HYDROCHLOROTHIAZIDE 25 MG/1
25 TABLET ORAL DAILY
Qty: 90 TABLET | Refills: 1 | Status: SHIPPED | OUTPATIENT
Start: 2022-09-23

## 2022-10-06 DIAGNOSIS — Z76.0 MEDICATION REFILL: ICD-10-CM

## 2022-10-06 RX ORDER — AMLODIPINE BESYLATE AND BENAZEPRIL HYDROCHLORIDE 5; 20 MG/1; MG/1
CAPSULE ORAL
Qty: 90 CAPSULE | Refills: 1 | Status: SHIPPED | OUTPATIENT
Start: 2022-10-06

## 2022-11-10 DIAGNOSIS — E78.00 PURE HYPERCHOLESTEROLEMIA: ICD-10-CM

## 2022-11-13 RX ORDER — PRAVASTATIN SODIUM 10 MG
TABLET ORAL
Qty: 90 TABLET | Refills: 0 | Status: SHIPPED | OUTPATIENT
Start: 2022-11-13

## 2023-02-12 DIAGNOSIS — E78.00 PURE HYPERCHOLESTEROLEMIA: ICD-10-CM

## 2023-02-13 RX ORDER — PRAVASTATIN SODIUM 10 MG
TABLET ORAL
Qty: 90 TABLET | Refills: 0 | Status: SHIPPED | OUTPATIENT
Start: 2023-02-13

## 2023-04-04 ENCOUNTER — TELEPHONE (OUTPATIENT)
Dept: FAMILY MEDICINE CLINIC | Facility: CLINIC | Age: 68
End: 2023-04-04

## 2023-04-04 DIAGNOSIS — Z12.31 BREAST CANCER SCREENING BY MAMMOGRAM: Primary | ICD-10-CM

## 2023-05-09 ENCOUNTER — APPOINTMENT (OUTPATIENT)
Dept: LAB | Facility: CLINIC | Age: 68
End: 2023-05-09

## 2023-05-09 DIAGNOSIS — E55.9 VITAMIN D DEFICIENCY: ICD-10-CM

## 2023-05-09 DIAGNOSIS — Z13.29 THYROID DISORDER SCREEN: ICD-10-CM

## 2023-05-09 DIAGNOSIS — Z13.0 SCREENING FOR DEFICIENCY ANEMIA: ICD-10-CM

## 2023-05-09 DIAGNOSIS — E78.2 MIXED HYPERLIPIDEMIA: ICD-10-CM

## 2023-05-09 DIAGNOSIS — I10 BENIGN ESSENTIAL HYPERTENSION: ICD-10-CM

## 2023-05-09 DIAGNOSIS — M85.80 OSTEOPENIA, UNSPECIFIED LOCATION: ICD-10-CM

## 2023-05-09 LAB
25(OH)D3 SERPL-MCNC: 31.1 NG/ML (ref 30–100)
ALBUMIN SERPL BCP-MCNC: 3.6 G/DL (ref 3.5–5)
ALP SERPL-CCNC: 76 U/L (ref 46–116)
ALT SERPL W P-5'-P-CCNC: 23 U/L (ref 12–78)
ANION GAP SERPL CALCULATED.3IONS-SCNC: 3 MMOL/L (ref 4–13)
AST SERPL W P-5'-P-CCNC: 18 U/L (ref 5–45)
BASOPHILS # BLD AUTO: 0.06 THOUSANDS/ÂΜL (ref 0–0.1)
BASOPHILS NFR BLD AUTO: 1 % (ref 0–1)
BILIRUB SERPL-MCNC: 0.41 MG/DL (ref 0.2–1)
BUN SERPL-MCNC: 13 MG/DL (ref 5–25)
CALCIUM SERPL-MCNC: 9.7 MG/DL (ref 8.3–10.1)
CHLORIDE SERPL-SCNC: 106 MMOL/L (ref 96–108)
CHOLEST SERPL-MCNC: 196 MG/DL
CO2 SERPL-SCNC: 28 MMOL/L (ref 21–32)
CREAT SERPL-MCNC: 0.68 MG/DL (ref 0.6–1.3)
EOSINOPHIL # BLD AUTO: 0.21 THOUSAND/ÂΜL (ref 0–0.61)
EOSINOPHIL NFR BLD AUTO: 4 % (ref 0–6)
ERYTHROCYTE [DISTWIDTH] IN BLOOD BY AUTOMATED COUNT: 13.5 % (ref 11.6–15.1)
GFR SERPL CREATININE-BSD FRML MDRD: 89 ML/MIN/1.73SQ M
GLUCOSE P FAST SERPL-MCNC: 90 MG/DL (ref 65–99)
HCT VFR BLD AUTO: 40 % (ref 34.8–46.1)
HDLC SERPL-MCNC: 71 MG/DL
HGB BLD-MCNC: 13 G/DL (ref 11.5–15.4)
IMM GRANULOCYTES # BLD AUTO: 0.01 THOUSAND/UL (ref 0–0.2)
IMM GRANULOCYTES NFR BLD AUTO: 0 % (ref 0–2)
LDLC SERPL CALC-MCNC: 110 MG/DL (ref 0–100)
LYMPHOCYTES # BLD AUTO: 1.8 THOUSANDS/ÂΜL (ref 0.6–4.47)
LYMPHOCYTES NFR BLD AUTO: 33 % (ref 14–44)
MCH RBC QN AUTO: 29 PG (ref 26.8–34.3)
MCHC RBC AUTO-ENTMCNC: 32.5 G/DL (ref 31.4–37.4)
MCV RBC AUTO: 89 FL (ref 82–98)
MONOCYTES # BLD AUTO: 0.43 THOUSAND/ÂΜL (ref 0.17–1.22)
MONOCYTES NFR BLD AUTO: 8 % (ref 4–12)
NEUTROPHILS # BLD AUTO: 2.98 THOUSANDS/ÂΜL (ref 1.85–7.62)
NEUTS SEG NFR BLD AUTO: 54 % (ref 43–75)
NONHDLC SERPL-MCNC: 125 MG/DL
NRBC BLD AUTO-RTO: 0 /100 WBCS
PLATELET # BLD AUTO: 285 THOUSANDS/UL (ref 149–390)
PMV BLD AUTO: 11 FL (ref 8.9–12.7)
POTASSIUM SERPL-SCNC: 4.3 MMOL/L (ref 3.5–5.3)
PROT SERPL-MCNC: 7.3 G/DL (ref 6.4–8.4)
RBC # BLD AUTO: 4.49 MILLION/UL (ref 3.81–5.12)
SODIUM SERPL-SCNC: 137 MMOL/L (ref 135–147)
TRIGL SERPL-MCNC: 77 MG/DL
TSH SERPL DL<=0.05 MIU/L-ACNC: 3.11 UIU/ML (ref 0.45–4.5)
WBC # BLD AUTO: 5.49 THOUSAND/UL (ref 4.31–10.16)

## 2023-05-19 ENCOUNTER — RA CDI HCC (OUTPATIENT)
Dept: OTHER | Facility: HOSPITAL | Age: 68
End: 2023-05-19

## 2023-05-19 NOTE — PROGRESS NOTES
[facility] [address]  coding opportunities       Chart reviewed, no opportunity found: CHART REVIEWED, NO OPPORTUNITY FOUND        Patients Insurance     Medicare Insurance: Medicare

## 2023-05-26 ENCOUNTER — OFFICE VISIT (OUTPATIENT)
Dept: FAMILY MEDICINE CLINIC | Facility: CLINIC | Age: 68
End: 2023-05-26

## 2023-05-26 VITALS
HEIGHT: 63 IN | WEIGHT: 208.4 LBS | TEMPERATURE: 97.3 F | BODY MASS INDEX: 36.93 KG/M2 | SYSTOLIC BLOOD PRESSURE: 132 MMHG | OXYGEN SATURATION: 98 % | DIASTOLIC BLOOD PRESSURE: 80 MMHG | HEART RATE: 64 BPM

## 2023-05-26 DIAGNOSIS — M85.80 OSTEOPENIA, UNSPECIFIED LOCATION: ICD-10-CM

## 2023-05-26 DIAGNOSIS — C44.92 SQUAMOUS CELL SKIN CANCER: ICD-10-CM

## 2023-05-26 DIAGNOSIS — D03.9 MELANOMA IN SITU, UNSPECIFIED SITE (HCC): ICD-10-CM

## 2023-05-26 DIAGNOSIS — Z00.00 MEDICARE ANNUAL WELLNESS VISIT, SUBSEQUENT: Primary | ICD-10-CM

## 2023-05-26 DIAGNOSIS — I10 BENIGN ESSENTIAL HYPERTENSION: ICD-10-CM

## 2023-05-26 DIAGNOSIS — E78.2 MIXED HYPERLIPIDEMIA: ICD-10-CM

## 2023-05-26 PROBLEM — E66.01 MORBID OBESITY (HCC): Status: RESOLVED | Noted: 2021-05-04 | Resolved: 2023-05-26

## 2023-05-26 RX ORDER — AMLODIPINE BESYLATE AND BENAZEPRIL HYDROCHLORIDE 5; 20 MG/1; MG/1
1 CAPSULE ORAL DAILY
Qty: 90 CAPSULE | Refills: 1 | Status: SHIPPED | OUTPATIENT
Start: 2023-05-26

## 2023-05-26 RX ORDER — PRAVASTATIN SODIUM 10 MG
10 TABLET ORAL EVERY MORNING
Qty: 90 TABLET | Refills: 1 | Status: SHIPPED | OUTPATIENT
Start: 2023-05-26

## 2023-05-26 RX ORDER — HYDROCHLOROTHIAZIDE 25 MG/1
25 TABLET ORAL DAILY
Qty: 90 TABLET | Refills: 1 | Status: SHIPPED | OUTPATIENT
Start: 2023-05-26

## 2023-05-26 NOTE — PROGRESS NOTES
Assessment and Plan:     Problem List Items Addressed This Visit        Cardiovascular and Mediastinum    Benign essential hypertension    Relevant Medications    amLODIPine-benazepril (LOTREL 5-20) 5-20 MG per capsule    hydrochlorothiazide (HYDRODIURIL) 25 mg tablet       Musculoskeletal and Integument    Osteopenia       Other    Mixed hyperlipidemia    Relevant Medications    pravastatin (PRAVACHOL) 10 mg tablet    Melanoma in situ ([facility] [address] )    BMI 37 0-37 9, adult   Other Visit Diagnoses     Medicare annual wellness visit, subsequent    -  Primary    Squamous cell skin cancer        2021 [facility]      pt follows with dermatologist for hx melanoma and newer dx [facility]     Preventive health issues were discussed with patient, and age appropriate screening tests were ordered as noted in patient's After Visit Summary  Personalized health advice and appropriate referrals for health education or preventive services given if needed, as noted in patient's After Visit Summary  BMI Counseling: Body mass index is 37 51 kg/m²  The BMI is above normal  Nutrition recommendations include reducing portion sizes  Exercise recommendations include exercising 3-5 times per week       History of Present Illness:     Patient presents for a Medicare Wellness Visit + f/u    Scheduled Medicare AV + f/u visit  This is the first time I am seeing this pt- she followed with other provider here who left practice  Fingers and feet swell in the heat     Patient Care Team:  Aleksandra Scott DO as PCP - General (Family Medicine)     Review of Systems:     Review of Systems     Problem List:     Patient Active Problem List   Diagnosis   • Abnormal mammogram of left breast   • Benign essential hypertension   • FH: CAD (coronary artery disease)   • Melanoma in situ ([facility] [address] )   • Osteopenia   • Vitamin D deficiency   • BMI 37 0-37 9, adult   • Mixed hyperlipidemia      Past Medical and Surgical History:     Past Medical History:   Diagnosis Date   • Morbid obesity ([facility] [address] ) 5/4/2021     Past Surgical History:   Procedure Laterality Date   • BREAST BIOPSY Left 2017    benign   • BREAST CYST EXCISION Left     benign   • MOHS SURGERY     • SALPINGOOPHORECTOMY  1973   • WISDOM TOOTH EXTRACTION        Family History:     Family History   Problem Relation Age of Onset   • Hypertension Mother    • Cancer Father         lung   • Heart disease Father    • Arthritis Father    • Asthma Father    • No Known Problems Daughter    • No Known Problems Maternal Grandmother    • No Known Problems Maternal Grandfather    • No Known Problems Paternal Grandmother    • No Known Problems Paternal Grandfather    • No Known Problems Maternal Aunt    • No Known Problems Paternal Aunt    • No Known Problems Paternal Aunt       Social History:     Social History     Socioeconomic History   • Marital status: /Civil Union     Spouse name: None   • Number of children: None   • Years of education: None   • Highest education level: None   Occupational History   • None   Tobacco Use   • Smoking status: Former   • Smokeless tobacco: Never   Vaping Use   • Vaping Use: Never used   Substance and Sexual Activity   • Alcohol use: Yes     Comment: couple glasses of wine a week   • Drug use: Never   • Sexual activity: Yes     Partners: Male     Birth control/protection: Post-menopausal   Other Topics Concern   • None   Social History Narrative   • None     Social Determinants of Health     Financial Resource Strain: Low Risk  (5/26/2023)    Overall Financial Resource Strain (CARDIA)    • Difficulty of Paying Living Expenses: Not hard at all   Food Insecurity: Not on file   Transportation Needs: No Transportation Needs (5/26/2023)    PRAPARE - Transportation    • Lack of Transportation (Medical): No    • Lack of Transportation (Non-Medical):  No   Physical Activity: Not on file   Stress: Not on file   Social Connections: Not on file   Intimate Partner Violence: Not on file   Housing Stability: Not on file      Medications and Allergies:     Current Outpatient Medications   Medication Sig Dispense Refill   • amLODIPine-benazepril (LOTREL 5-20) 5-20 MG per capsule Take 1 capsule by mouth daily 90 capsule 1   • aspirin 81 MG tablet Take 81 mg by mouth daily     • Calcium Carb-Cholecalciferol (CALCIUM-VITAMIN D) 600-400 MG-UNIT TABS Take 1 tablet by mouth daily     • cholecalciferol (VITAMIN D3) 1,000 units tablet Take 1,000 Units by mouth daily     • Coenzyme Q10 (CO Q 10) 100 MG CAPS Take 1 capsule by mouth daily     • Flaxseed, Linseed, (FLAXSEED OIL) 1000 MG CAPS Take 1 capsule by mouth daily     • hydrochlorothiazide (HYDRODIURIL) 25 mg tablet Take 1 tablet (25 mg total) by mouth daily 90 tablet 1   • Multiple Vitamin (MULTIVITAMIN) tablet Take 1 tablet by mouth daily     • pravastatin (PRAVACHOL) 10 mg tablet Take 1 tablet (10 mg total) by mouth every morning 90 tablet 1     No current facility-administered medications for this visit  Allergies   Allergen Reactions   • Clindamycin Diarrhea and GI Intolerance     Other reaction(s): Weakness   Severe stomach pains, muscle aches  Abdominal pains   weakness     • Rosuvastatin Myalgia     Myalgias  Leg cramps     • Simvastatin      Myalgias  Muscle spasms        Immunizations:     Immunization History   Administered Date(s) Administered   • COVID-19 PFIZER VACCINE 0 3 ML IM 03/22/2021, 04/14/2021   • INFLUENZA 10/02/2012, 01/17/2014, 01/07/2015, 01/08/2016, 02/12/2018, 11/10/2021   • Influenza, high dose seasonal 0 7 mL 11/03/2020, 11/10/2021   • Influenza, recombinant, quadrivalent,injectable, preservative free 10/10/2019   • Influenza, seasonal, injectable, preservative free 10/13/2010   • Pneumococcal Conjugate 13-Valent 10/10/2019   • Pneumococcal Polysaccharide PPV23 05/10/2021   • Td (adult), adsorbed 07/06/2015   • Zoster 07/21/2016      Health Maintenance:         Topic Date Due   • DXA SCAN  09/12/2023   • Breast Cancer Screening: Mammogram  04/14/2024 • Colorectal Cancer Screening  03/07/2027   • Hepatitis C Screening  Completed         Topic Date Due   • COVID-19 Vaccine (3 - Pfizer series) 06/09/2021      Medicare Screening Tests and Risk Assessments:         Health Risk Assessment:   Patient rates overall health as very good  Patient feels that their physical health rating is same  Patient is very satisfied with their life  Eyesight was rated as slightly worse  Hearing was rated as same  Patient feels that their emotional and mental health rating is same  Patients states they are never, rarely angry  Patient states they are never, rarely unusually tired/fatigued  Pain experienced in the last 7 days has been some  Patient's pain rating has been 3/10  Patient states that she has experienced no weight loss or gain in last 6 months  Depression Screening:   PHQ-2 Score: 0      Fall Risk Screening: In the past year, patient has experienced: no history of falling in past year      Urinary Incontinence Screening:   Patient has not leaked urine accidently in the last six months  Home Safety:  Patient does not have trouble with stairs inside or outside of their home  Patient has working smoke alarms and has working carbon monoxide detector  Home safety hazards include: none  Nutrition:   Current diet is Regular  Medications:   Patient is currently taking over-the-counter supplements  OTC medications include: Listed on my chart  Patient is able to manage medications  Activities of Daily Living (ADLs)/Instrumental Activities of Daily Living (IADLs):   Walk and transfer into and out of bed and chair?: Yes  Dress and groom yourself?: Yes    Bathe or shower yourself?: Yes    Feed yourself?  Yes  Do your laundry/housekeeping?: Yes  Manage your money, pay your bills and track your expenses?: Yes  Make your own meals?: Yes    Do your own shopping?: Yes    Previous Hospitalizations:   Any hospitalizations or ED visits within the last 12 months?: No "    Advance Care Planning:   Living will: No    Durable POA for healthcare: No    Advanced directive: No      PREVENTIVE SCREENINGS      Cardiovascular Screening:    General: Screening Not Indicated and History Lipid Disorder      Diabetes Screening:     General: Screening Current      Colorectal Cancer Screening:     General: Screening Current      Breast Cancer Screening:     General: Screening Current      Cervical Cancer Screening:    General: Screening Not Indicated      Osteoporosis Screening:    General: Screening Current      Lung Cancer Screening:     General: Screening Not Indicated      Hepatitis C Screening:    General: Screening Current    Screening, Brief Intervention, and Referral to Treatment (SBIRT)    Screening  Typical number of drinks in a day: 0  Typical number of drinks in a week: 0  Interpretation: Low risk drinking behavior  AUDIT-C Screenin) How often did you have a drink containing alcohol in the past year? monthly or less  2) How many drinks did you have on a typical day when you were drinking in the past year? 1 to 2  3) How often did you have 6 or more drinks on one occasion in the past year? never    AUDIT-C Score: 1  Interpretation: Score 0-2 (female): Negative screen for alcohol misuse    Single Item Drug Screening:  How often have you used an illegal drug (including marijuana) or a prescription medication for non-medical reasons in the past year? never    Single Item Drug Screen Score: 0  Interpretation: Negative screen for possible drug use disorder    No results found  Physical Exam:     /80 (BP Location: Left arm, Patient Position: Sitting, Cuff Size: Large)   Pulse 64   Temp (!) 97 3 °F (36 3 °C) (Tympanic)   Ht 5' 2 5\" (1 588 m)   Wt 94 5 kg (208 lb 6 4 oz)   LMP 2009 (Exact Date)   SpO2 98%   BMI 37 51 kg/m²     Physical Exam  Vitals and nursing note reviewed  Constitutional:       General: She is not in acute distress       Appearance: She is " not ill-appearing, toxic-appearing or diaphoretic  HENT:      Head: Normocephalic and atraumatic  Nose: Nose normal       Mouth/Throat:      Mouth: Mucous membranes are moist    Eyes:      Conjunctiva/sclera: Conjunctivae normal    Cardiovascular:      Rate and Rhythm: Normal rate and regular rhythm  Pulses: Normal pulses  Heart sounds: Normal heart sounds  Pulmonary:      Effort: Pulmonary effort is normal       Breath sounds: Normal breath sounds and air entry  Abdominal:      General: There is no distension  Palpations: Abdomen is soft  There is no shifting dullness, hepatomegaly, splenomegaly or mass  Tenderness: There is no abdominal tenderness  Musculoskeletal:      Right lower leg: No edema  Left lower leg: No edema  Skin:     Coloration: Skin is not pale  Neurological:      Mental Status: She is alert and oriented to person, place, and time  Gait: Gait normal    Psychiatric:         Mood and Affect: Mood normal          Behavior: Behavior normal  Behavior is cooperative            Charles Florian DO

## 2023-05-26 NOTE — PATIENT INSTRUCTIONS
Medicare Preventive Visit Patient Instructions  Thank you for completing your Welcome to Medicare Visit or Medicare Annual Wellness Visit today  Your next wellness visit will be due in one year (5/26/2024)  The screening/preventive services that you may require over the next 5-10 years are detailed below  Some tests may not apply to you based off risk factors and/or age  Screening tests ordered at today's visit but not completed yet may show as past due  Also, please note that scanned in results may not display below  Preventive Screenings:  Service Recommendations Previous Testing/Comments   Colorectal Cancer Screening  * Colonoscopy    * Fecal Occult Blood Test (FOBT)/Fecal Immunochemical Test (FIT)  * Fecal DNA/Cologuard Test  * Flexible Sigmoidoscopy Age: 39-70 years old   Colonoscopy: every 10 years (may be performed more frequently if at higher risk)  OR  FOBT/FIT: every 1 year  OR  Cologuard: every 3 years  OR  Sigmoidoscopy: every 5 years  Screening may be recommended earlier than age 39 if at higher risk for colorectal cancer  Also, an individualized decision between you and your healthcare provider will decide whether screening between the ages of 74-80 would be appropriate  Colonoscopy: 03/07/2017  FOBT/FIT: Not on file  Cologuard: Not on file  Sigmoidoscopy: Not on file    Screening Current     Breast Cancer Screening Age: 36 years old  Frequency: every 1-2 years  Not required if history of left and right mastectomy Mammogram: 04/14/2023    Screening Current   Cervical Cancer Screening Between the ages of 21-29, pap smear recommended once every 3 years  Between the ages of 33-67, can perform pap smear with HPV co-testing every 5 years     Recommendations may differ for women with a history of total hysterectomy, cervical cancer, or abnormal pap smears in past  Pap Smear: 03/03/2020    Screening Not Indicated   Hepatitis C Screening Once for adults born between 1945 and 1965  More frequently in patients at high risk for Hepatitis C Hep C Antibody: 04/15/2020    Screening Current   Diabetes Screening 1-2 times per year if you're at risk for diabetes or have pre-diabetes Fasting glucose: 90 mg/dL (5/9/2023)  A1C: 5 6 % (2/13/2019)  Screening Current   Cholesterol Screening Once every 5 years if you don't have a lipid disorder  May order more often based on risk factors  Lipid panel: 05/09/2023    Screening Not Indicated  History Lipid Disorder     Other Preventive Screenings Covered by Medicare:  1  Abdominal Aortic Aneurysm (AAA) Screening: covered once if your at risk  You're considered to be at risk if you have a family history of AAA  2  Lung Cancer Screening: covers low dose CT scan once per year if you meet all of the following conditions: (1) Age 50-69; (2) No signs or symptoms of lung cancer; (3) Current smoker or have quit smoking within the last 15 years; (4) You have a tobacco smoking history of at least 20 pack years (packs per day multiplied by number of years you smoked); (5) You get a written order from a healthcare provider  3  Glaucoma Screening: covered annually if you're considered high risk: (1) You have diabetes OR (2) Family history of glaucoma OR (3)  aged 48 and older OR (3)  American aged 72 and older  3  Osteoporosis Screening: covered every 2 years if you meet one of the following conditions: (1) You're estrogen deficient and at risk for osteoporosis based off medical history and other findings; (2) Have a vertebral abnormality; (3) On glucocorticoid therapy for more than 3 months; (4) Have primary hyperparathyroidism; (5) On osteoporosis medications and need to assess response to drug therapy  · Last bone density test (DXA Scan): 09/12/2022   5  HIV Screening: covered annually if you're between the age of 15-65  Also covered annually if you are younger than 13 and older than 72 with risk factors for HIV infection   For pregnant patients, it is covered up to 3 times per pregnancy  Immunizations:  Immunization Recommendations   Influenza Vaccine Annual influenza vaccination during flu season is recommended for all persons aged >= 6 months who do not have contraindications   Pneumococcal Vaccine   * Pneumococcal conjugate vaccine = PCV13 (Prevnar 13), PCV15 (Vaxneuvance), PCV20 (Prevnar 20)  * Pneumococcal polysaccharide vaccine = PPSV23 (Pneumovax) Adults 25-60 years old: 1-3 doses may be recommended based on certain risk factors  Adults 72 years old: 1-2 doses may be recommended based off what pneumonia vaccine you previously received   Hepatitis B Vaccine 3 dose series if at intermediate or high risk (ex: diabetes, end stage renal disease, liver disease)   Tetanus (Td) Vaccine - COST NOT COVERED BY MEDICARE PART B Following completion of primary series, a booster dose should be given every 10 years to maintain immunity against tetanus  Td may also be given as tetanus wound prophylaxis  Tdap Vaccine - COST NOT COVERED BY MEDICARE PART B Recommended at least once for all adults  For pregnant patients, recommended with each pregnancy  Shingles Vaccine (Shingrix) - COST NOT COVERED BY MEDICARE PART B  2 shot series recommended in those aged 48 and above     Health Maintenance Due:      Topic Date Due   • DXA SCAN  09/12/2023   • Breast Cancer Screening: Mammogram  04/14/2024   • Colorectal Cancer Screening  03/07/2027   • Hepatitis C Screening  Completed     Immunizations Due:      Topic Date Due   • COVID-19 Vaccine (3 - Pfizer series) 06/09/2021     Advance Directives   What are advance directives? Advance directives are legal documents that state your wishes and plans for medical care  These plans are made ahead of time in case you lose your ability to make decisions for yourself  Advance directives can apply to any medical decision, such as the treatments you want, and if you want to donate organs  What are the types of advance directives?   There are many types of advance directives, and each state has rules about how to use them  You may choose a combination of any of the following:  · Living will: This is a written record of the treatment you want  You can also choose which treatments you do not want, which to limit, and which to stop at a certain time  This includes surgery, medicine, IV fluid, and tube feedings  · Durable power of  for healthcare [facility]): This is a written record that states who you want to make healthcare choices for you when you are unable to make them for yourself  This person, called a proxy, is usually a family member or a friend  You may choose more than 1 proxy  · Do not resuscitate (DNR) order:  A DNR order is used in case your heart stops beating or you stop breathing  It is a request not to have certain forms of treatment, such as CPR  A DNR order may be included in other types of advance directives  · Medical directive: This covers the care that you want if you are in a coma, near death, or unable to make decisions for yourself  You can list the treatments you want for each condition  Treatment may include pain medicine, surgery, blood transfusions, dialysis, IV or tube feedings, and a ventilator (breathing machine)  · Values history: This document has questions about your views, beliefs, and how you feel and think about life  This information can help others choose the care that you would choose  Why are advance directives important? An advance directive helps you control your care  Although spoken wishes may be used, it is better to have your wishes written down  Spoken wishes can be misunderstood, or not followed  Treatments may be given even if you do not want them  An advance directive may make it easier for your family to make difficult choices about your care     Weight Management   Why it is important to manage your weight:  Being overweight increases your risk of health conditions such as heart disease, high blood pressure, type 2 diabetes, and certain types of cancer  It can also increase your risk for osteoarthritis, sleep apnea, and other respiratory problems  Aim for a slow, steady weight loss  Even a small amount of weight loss can lower your risk of health problems  How to lose weight safely:  A safe and healthy way to lose weight is to eat fewer calories and get regular exercise  You can lose up about 1 pound a week by decreasing the number of calories you eat by 500 calories each day  Healthy meal plan for weight management:  A healthy meal plan includes a variety of foods, contains fewer calories, and helps you stay healthy  A healthy meal plan includes the following:  · Eat whole-grain foods more often  A healthy meal plan should contain fiber  Fiber is the part of grains, fruits, and vegetables that is not broken down by your body  Whole-grain foods are healthy and provide extra fiber in your diet  Some examples of whole-grain foods are whole-wheat breads and pastas, oatmeal, brown rice, and bulgur  · Eat a variety of vegetables every day  Include dark, leafy greens such as spinach, kale, leila greens, and mustard greens  Eat yellow and orange vegetables such as carrots, sweet potatoes, and winter squash  · Eat a variety of fruits every day  Choose fresh or canned fruit (canned in its own juice or light syrup) instead of juice  Fruit juice has very little or no fiber  · Eat low-fat dairy foods  Drink fat-free (skim) milk or 1% milk  Eat fat-free yogurt and low-fat cottage cheese  Try low-fat cheeses such as mozzarella and other reduced-fat cheeses  · Choose meat and other protein foods that are low in fat  Choose beans or other legumes such as split peas or lentils  Choose fish, skinless poultry (chicken or turkey), or lean cuts of red meat (beef or pork)  Before you cook meat or poultry, cut off any visible fat  · Use less fat and oil  Try baking foods instead of frying them   Add less fat, such as margarine, sour cream, regular salad dressing and mayonnaise to foods  Eat fewer high-fat foods  Some examples of high-fat foods include french fries, doughnuts, ice cream, and cakes  · Eat fewer sweets  Limit foods and drinks that are high in sugar  This includes candy, cookies, regular soda, and sweetened drinks  Exercise:  Exercise at least 30 minutes per day on most days of the week  Some examples of exercise include walking, biking, dancing, and swimming  You can also fit in more physical activity by taking the stairs instead of the elevator or parking farther away from stores  Ask your healthcare provider about the best exercise plan for you  © Copyright [employer] 2018 Information is for End User's use only and may not be sold, redistributed or otherwise used for commercial purposes   All illustrations and images included in CareNotes® are the copyrighted property of A D A M , Inc  or [address] [place] [address]

## 2023-12-01 DIAGNOSIS — I10 BENIGN ESSENTIAL HYPERTENSION: ICD-10-CM

## 2023-12-01 RX ORDER — HYDROCHLOROTHIAZIDE 25 MG/1
25 TABLET ORAL DAILY
Qty: 90 TABLET | Refills: 1 | Status: SHIPPED | OUTPATIENT
Start: 2023-12-01

## 2024-01-02 DIAGNOSIS — I10 BENIGN ESSENTIAL HYPERTENSION: ICD-10-CM

## 2024-01-03 RX ORDER — AMLODIPINE BESYLATE AND BENAZEPRIL HYDROCHLORIDE 5; 20 MG/1; MG/1
1 CAPSULE ORAL DAILY
Qty: 90 CAPSULE | Refills: 0 | Status: SHIPPED | OUTPATIENT
Start: 2024-01-03

## 2024-02-18 DIAGNOSIS — E78.2 MIXED HYPERLIPIDEMIA: ICD-10-CM

## 2024-02-18 RX ORDER — PRAVASTATIN SODIUM 10 MG
10 TABLET ORAL EVERY MORNING
Qty: 90 TABLET | Refills: 1 | Status: SHIPPED | OUTPATIENT
Start: 2024-02-18 | End: 2024-02-19 | Stop reason: SDUPTHER

## 2024-02-19 DIAGNOSIS — E78.2 MIXED HYPERLIPIDEMIA: ICD-10-CM

## 2024-02-19 RX ORDER — PRAVASTATIN SODIUM 10 MG
10 TABLET ORAL EVERY MORNING
Qty: 90 TABLET | Refills: 1 | Status: SHIPPED | OUTPATIENT
Start: 2024-02-19

## 2024-02-19 NOTE — TELEPHONE ENCOUNTER
Not a duplicate, change in pharmacy       Reason for call:   [x] Refill   [] Prior Auth  [] Other:     Office:   [x] PCP/Provider -   [] Specialty/Provider -     Medication: Pravastatin 10 mg, take 1 tablet by mouth every morning      Pharmacy: [facility]     Does the patient have enough for 3 days?   [x] Yes   [] No - Send as HP to POD

## 2024-03-30 DIAGNOSIS — I10 BENIGN ESSENTIAL HYPERTENSION: ICD-10-CM

## 2024-03-31 RX ORDER — AMLODIPINE BESYLATE AND BENAZEPRIL HYDROCHLORIDE 5; 20 MG/1; MG/1
1 CAPSULE ORAL DAILY
Qty: 30 CAPSULE | Refills: 0 | Status: SHIPPED | OUTPATIENT
Start: 2024-03-31

## 2024-05-02 DIAGNOSIS — I10 BENIGN ESSENTIAL HYPERTENSION: ICD-10-CM

## 2024-05-04 DIAGNOSIS — I10 BENIGN ESSENTIAL HYPERTENSION: Primary | ICD-10-CM

## 2024-05-04 DIAGNOSIS — Z13.0 SCREENING FOR DEFICIENCY ANEMIA: ICD-10-CM

## 2024-05-04 DIAGNOSIS — E78.2 MIXED HYPERLIPIDEMIA: ICD-10-CM

## 2024-05-04 DIAGNOSIS — Z79.899 ENCOUNTER FOR LONG-TERM (CURRENT) USE OF MEDICATIONS: ICD-10-CM

## 2024-05-04 DIAGNOSIS — Z86.39 HISTORY OF VITAMIN D DEFICIENCY: ICD-10-CM

## 2024-05-04 DIAGNOSIS — Z12.31 BREAST CANCER SCREENING BY MAMMOGRAM: Primary | ICD-10-CM

## 2024-05-04 DIAGNOSIS — M85.80 OSTEOPENIA, UNSPECIFIED LOCATION: ICD-10-CM

## 2024-05-04 DIAGNOSIS — Z13.29 THYROID DISORDER SCREEN: ICD-10-CM

## 2024-05-04 RX ORDER — AMLODIPINE BESYLATE AND BENAZEPRIL HYDROCHLORIDE 5; 20 MG/1; MG/1
1 CAPSULE ORAL DAILY
Qty: 90 CAPSULE | Refills: 0 | Status: SHIPPED | OUTPATIENT
Start: 2024-05-04

## 2024-05-04 NOTE — TELEPHONE ENCOUNTER
Please advise pt that I placed lab orders to obtain prior to her appt here and also order for mammogram for her to schedule

## 2024-05-06 ENCOUNTER — TELEPHONE (OUTPATIENT)
Dept: FAMILY MEDICINE CLINIC | Facility: CLINIC | Age: 69
End: 2024-05-06

## 2024-05-10 ENCOUNTER — HOSPITAL ENCOUNTER (OUTPATIENT)
Dept: MAMMOGRAPHY | Facility: HOSPITAL | Age: 69
End: 2024-05-10
Payer: MEDICARE

## 2024-05-10 VITALS — BODY MASS INDEX: 36.93 KG/M2 | WEIGHT: 208.4 LBS | HEIGHT: 63 IN

## 2024-05-10 DIAGNOSIS — Z12.31 BREAST CANCER SCREENING BY MAMMOGRAM: ICD-10-CM

## 2024-05-10 PROCEDURE — 77067 SCR MAMMO BI INCL CAD: CPT

## 2024-05-10 PROCEDURE — 77063 BREAST TOMOSYNTHESIS BI: CPT

## 2024-05-20 ENCOUNTER — RA CDI HCC (OUTPATIENT)
Dept: OTHER | Facility: HOSPITAL | Age: 69
End: 2024-05-20

## 2024-05-20 NOTE — PROGRESS NOTES
HCC coding opportunities          Chart Reviewed number of suggestions sent to Provider: 1  E66.01     Patients Insurance     Medicare Insurance: Medicare

## 2024-05-21 ENCOUNTER — APPOINTMENT (OUTPATIENT)
Dept: LAB | Facility: CLINIC | Age: 69
End: 2024-05-21
Payer: MEDICARE

## 2024-05-21 DIAGNOSIS — Z79.899 ENCOUNTER FOR LONG-TERM (CURRENT) USE OF MEDICATIONS: ICD-10-CM

## 2024-05-21 DIAGNOSIS — Z13.0 SCREENING FOR DEFICIENCY ANEMIA: ICD-10-CM

## 2024-05-21 DIAGNOSIS — Z86.39 HISTORY OF VITAMIN D DEFICIENCY: ICD-10-CM

## 2024-05-21 DIAGNOSIS — I10 BENIGN ESSENTIAL HYPERTENSION: ICD-10-CM

## 2024-05-21 DIAGNOSIS — Z13.29 THYROID DISORDER SCREEN: ICD-10-CM

## 2024-05-21 DIAGNOSIS — M85.80 OSTEOPENIA, UNSPECIFIED LOCATION: ICD-10-CM

## 2024-05-21 DIAGNOSIS — E78.2 MIXED HYPERLIPIDEMIA: ICD-10-CM

## 2024-05-21 LAB
25(OH)D3 SERPL-MCNC: 40.9 NG/ML (ref 30–100)
ALBUMIN SERPL BCP-MCNC: 4.3 G/DL (ref 3.5–5)
ALP SERPL-CCNC: 68 U/L (ref 34–104)
ALT SERPL W P-5'-P-CCNC: 15 U/L (ref 7–52)
ANION GAP SERPL CALCULATED.3IONS-SCNC: 7 MMOL/L (ref 4–13)
AST SERPL W P-5'-P-CCNC: 20 U/L (ref 13–39)
BASOPHILS # BLD AUTO: 0.07 THOUSANDS/ÂΜL (ref 0–0.1)
BASOPHILS NFR BLD AUTO: 1 % (ref 0–1)
BILIRUB SERPL-MCNC: 0.54 MG/DL (ref 0.2–1)
BUN SERPL-MCNC: 14 MG/DL (ref 5–25)
CALCIUM SERPL-MCNC: 9.5 MG/DL (ref 8.4–10.2)
CHLORIDE SERPL-SCNC: 104 MMOL/L (ref 96–108)
CHOLEST SERPL-MCNC: 189 MG/DL
CO2 SERPL-SCNC: 29 MMOL/L (ref 21–32)
CREAT SERPL-MCNC: 0.58 MG/DL (ref 0.6–1.3)
CREAT UR-MCNC: 191.1 MG/DL
EOSINOPHIL # BLD AUTO: 0.21 THOUSAND/ÂΜL (ref 0–0.61)
EOSINOPHIL NFR BLD AUTO: 4 % (ref 0–6)
ERYTHROCYTE [DISTWIDTH] IN BLOOD BY AUTOMATED COUNT: 13.6 % (ref 11.6–15.1)
GFR SERPL CREATININE-BSD FRML MDRD: 93 ML/MIN/1.73SQ M
GLUCOSE P FAST SERPL-MCNC: 89 MG/DL (ref 65–99)
HCT VFR BLD AUTO: 40.5 % (ref 34.8–46.1)
HDLC SERPL-MCNC: 71 MG/DL
HGB BLD-MCNC: 13.2 G/DL (ref 11.5–15.4)
IMM GRANULOCYTES # BLD AUTO: 0.01 THOUSAND/UL (ref 0–0.2)
IMM GRANULOCYTES NFR BLD AUTO: 0 % (ref 0–2)
LDLC SERPL CALC-MCNC: 102 MG/DL (ref 0–100)
LYMPHOCYTES # BLD AUTO: 2.06 THOUSANDS/ÂΜL (ref 0.6–4.47)
LYMPHOCYTES NFR BLD AUTO: 35 % (ref 14–44)
MCH RBC QN AUTO: 29.5 PG (ref 26.8–34.3)
MCHC RBC AUTO-ENTMCNC: 32.6 G/DL (ref 31.4–37.4)
MCV RBC AUTO: 91 FL (ref 82–98)
MICROALBUMIN UR-MCNC: 15.7 MG/L
MICROALBUMIN/CREAT 24H UR: 8 MG/G CREATININE (ref 0–30)
MONOCYTES # BLD AUTO: 0.46 THOUSAND/ÂΜL (ref 0.17–1.22)
MONOCYTES NFR BLD AUTO: 8 % (ref 4–12)
NEUTROPHILS # BLD AUTO: 3.1 THOUSANDS/ÂΜL (ref 1.85–7.62)
NEUTS SEG NFR BLD AUTO: 52 % (ref 43–75)
NRBC BLD AUTO-RTO: 0 /100 WBCS
PLATELET # BLD AUTO: 289 THOUSANDS/UL (ref 149–390)
PMV BLD AUTO: 10.7 FL (ref 8.9–12.7)
POTASSIUM SERPL-SCNC: 4 MMOL/L (ref 3.5–5.3)
PROT SERPL-MCNC: 7 G/DL (ref 6.4–8.4)
RBC # BLD AUTO: 4.47 MILLION/UL (ref 3.81–5.12)
SODIUM SERPL-SCNC: 140 MMOL/L (ref 135–147)
TRIGL SERPL-MCNC: 81 MG/DL
TSH SERPL DL<=0.05 MIU/L-ACNC: 2.88 UIU/ML (ref 0.45–4.5)
WBC # BLD AUTO: 5.91 THOUSAND/UL (ref 4.31–10.16)

## 2024-05-21 PROCEDURE — 82306 VITAMIN D 25 HYDROXY: CPT

## 2024-05-21 PROCEDURE — 80053 COMPREHEN METABOLIC PANEL: CPT

## 2024-05-21 PROCEDURE — 82570 ASSAY OF URINE CREATININE: CPT

## 2024-05-21 PROCEDURE — 82043 UR ALBUMIN QUANTITATIVE: CPT

## 2024-05-21 PROCEDURE — 85025 COMPLETE CBC W/AUTO DIFF WBC: CPT

## 2024-05-21 PROCEDURE — 36415 COLL VENOUS BLD VENIPUNCTURE: CPT

## 2024-05-21 PROCEDURE — 80061 LIPID PANEL: CPT

## 2024-05-21 PROCEDURE — 84443 ASSAY THYROID STIM HORMONE: CPT

## 2024-05-28 ENCOUNTER — OFFICE VISIT (OUTPATIENT)
Dept: FAMILY MEDICINE CLINIC | Facility: CLINIC | Age: 69
End: 2024-05-28
Payer: MEDICARE

## 2024-05-28 VITALS
HEART RATE: 67 BPM | SYSTOLIC BLOOD PRESSURE: 120 MMHG | OXYGEN SATURATION: 99 % | DIASTOLIC BLOOD PRESSURE: 68 MMHG | TEMPERATURE: 97.6 F | WEIGHT: 208.4 LBS | BODY MASS INDEX: 38.35 KG/M2 | HEIGHT: 62 IN

## 2024-05-28 DIAGNOSIS — M85.80 OSTEOPENIA, UNSPECIFIED LOCATION: ICD-10-CM

## 2024-05-28 DIAGNOSIS — Z78.0 POSTMENOPAUSAL: ICD-10-CM

## 2024-05-28 DIAGNOSIS — Z00.00 MEDICARE ANNUAL WELLNESS VISIT, SUBSEQUENT: Primary | ICD-10-CM

## 2024-05-28 DIAGNOSIS — I10 BENIGN ESSENTIAL HYPERTENSION: ICD-10-CM

## 2024-05-28 DIAGNOSIS — S69.91XS INJURY OF RIGHT THUMB, SEQUELA: ICD-10-CM

## 2024-05-28 DIAGNOSIS — M79.644 PAIN OF RIGHT THUMB: ICD-10-CM

## 2024-05-28 DIAGNOSIS — M20.001 DEFORMITY OF JOINT OF THUMB, RIGHT: ICD-10-CM

## 2024-05-28 PROBLEM — D03.62 MELANOMA IN SITU OF LEFT SHOULDER (HCC): Status: RESOLVED | Noted: 2024-05-28 | Resolved: 2024-05-28

## 2024-05-28 PROBLEM — C44.729 SQUAMOUS CELL CARCINOMA, LEG, LEFT: Status: ACTIVE | Noted: 2024-05-28

## 2024-05-28 PROBLEM — D03.62 MELANOMA IN SITU OF LEFT SHOULDER (HCC): Status: ACTIVE | Noted: 2024-05-28

## 2024-05-28 PROCEDURE — 99214 OFFICE O/P EST MOD 30 MIN: CPT | Performed by: FAMILY MEDICINE

## 2024-05-28 PROCEDURE — G0439 PPPS, SUBSEQ VISIT: HCPCS | Performed by: FAMILY MEDICINE

## 2024-05-28 NOTE — PROGRESS NOTES
"Ambulatory Visit  Name: Bertha Segundo      : 1955      MRN: [id number]  Encounter Provider: Mouna Dodson DO  Encounter Date: 2024   Encounter department: [facility] [place]    Assessment & Plan   1. Medicare annual wellness visit, subsequent  2. BMI 38.0-38.9,adult  3. Benign essential hypertension  4. Osteopenia, unspecified location  -     DXA bone density spine hip and pelvis; Future; Expected date: 2024  5. Postmenopausal  -     DXA bone density spine hip and pelvis; Future; Expected date: 2024  6. Injury of right thumb, sequela  -     Ambulatory Referral to Orthopedic Surgery; Future  7. Deformity of joint of thumb, right  -     Ambulatory Referral to Orthopedic Surgery; Future  8. Pain of right thumb  -     Ambulatory Referral to Orthopedic Surgery; Future       Preventive health issues were discussed with patient, and age appropriate screening tests were ordered as noted in patient's After Visit Summary. Personalized health advice and appropriate referrals for health education or preventive services given if needed, as noted in patient's After Visit Summary.    Chief Complaint   Patient presents with    Medicare Wellness Visit    Follow-up           History of Present Illness     Medicare AWV + f/u  Also c/o new problem, Couple months ago jammed right thumb - splinted it, conservative care at home, but \"can't bend it and only hurts when try to bend it\"  Pt is right-handed       Patient Care Team:  Mouna Dodson DO as PCP - General (Family Medicine)    Review of Systems  Medical History Reviewed by provider this encounter:  Tobacco  Allergies  Meds  Problems  Med Hx  Surg Hx  Fam Hx       Annual Wellness Visit Questionnaire   Bertha is here for her Subsequent Wellness visit. Last Medicare Wellness visit information reviewed, patient interviewed and updates made to the record today.      Health Risk Assessment:   Patient rates overall health as very " good. Patient feels that their physical health rating is same. Patient is very satisfied with their life. Eyesight was rated as same. Hearing was rated as same. Patient feels that their emotional and mental health rating is same. Patients states they are never, rarely angry. Patient states they are never, rarely unusually tired/fatigued. Pain experienced in the last 7 days has been none. Patient states that she has experienced no weight loss or gain in last 6 months.     Depression Screening:   PHQ-2 Score: 0      Fall Risk Screening:   In the past year, patient has experienced: no history of falling in past year      Urinary Incontinence Screening:   Patient has not leaked urine accidently in the last six months.     Home Safety:  Patient does not have trouble with stairs inside or outside of their home. Patient has working smoke alarms and has working carbon monoxide detector. Home safety hazards include: none.     Nutrition:   Current diet is Regular.     Medications:   Patient is currently taking over-the-counter supplements. OTC medications include: see medication list. Patient is able to manage medications.     Activities of Daily Living (ADLs)/Instrumental Activities of Daily Living (IADLs):   Walk and transfer into and out of bed and chair?: Yes  Dress and groom yourself?: Yes    Bathe or shower yourself?: Yes    Feed yourself? Yes  Do your laundry/housekeeping?: Yes  Manage your money, pay your bills and track your expenses?: Yes  Make your own meals?: Yes    Do your own shopping?: Yes    Previous Hospitalizations:   Any hospitalizations or ED visits within the last 12 months?: No      Advance Care Planning:   Living will: Yes    Advanced directive: Yes      Cognitive Screening:   Provider or family/friend/caregiver concerned regarding cognition?: No    PREVENTIVE SCREENINGS      Cardiovascular Screening:    General: Screening Not Indicated and History Lipid Disorder      Diabetes Screening:     General:  Screening Current      Colorectal Cancer Screening:     General: Screening Current      Breast Cancer Screening:     General: Screening Current      Cervical Cancer Screening:    General: Screening Not Indicated      Osteoporosis Screening:    General: Screening Current      Abdominal Aortic Aneurysm (AAA) Screening:        General: Screening Current      Lung Cancer Screening:     General: Screening Not Indicated      Hepatitis C Screening:    General: Screening Current    Screening, Brief Intervention, and Referral to Treatment (SBIRT)    Screening  Typical number of drinks in a day: 0  Typical number of drinks in a week: 1  Interpretation: Low risk drinking behavior.    AUDIT-C Screenin) How often did you have a drink containing alcohol in the past year? monthly or less  2) How many drinks did you have on a typical day when you were drinking in the past year? 0  3) How often did you have 6 or more drinks on one occasion in the past year? never    AUDIT-C Score: 1  Interpretation: Score 0-2 (female): Negative screen for alcohol misuse    Single Item Drug Screening:  How often have you used an illegal drug (including marijuana) or a prescription medication for non-medical reasons in the past year? never    Single Item Drug Screen Score: 0  Interpretation: Negative screen for possible drug use disorder    Social Determinants of Health     Financial Resource Strain: Low Risk  (2023)    Overall Financial Resource Strain (CARDIA)     Difficulty of Paying Living Expenses: Not hard at all   Food Insecurity: No Food Insecurity (2024)    Hunger Vital Sign     Worried About Running Out of Food in the Last Year: Never true     Ran Out of Food in the Last Year: Never true   Transportation Needs: No Transportation Needs (2024)    PRAPARE - Transportation     Lack of Transportation (Medical): No     Lack of Transportation (Non-Medical): No   Housing Stability: Low Risk  (2024)    Housing Stability Vital  "Sign     Unable to Pay for Housing in the Last Year: No     Number of Times Moved in the Last Year: 0     Homeless in the Last Year: No   Utilities: Not At Risk (5/21/2024)    [facility] Utilities     Threatened with loss of utilities: No     No results found.    Objective     /68 (BP Location: Left arm, Patient Position: Sitting, Cuff Size: Standard)   Pulse 67   Temp 97.6 °F (36.4 °C) (Tympanic)   Ht 5' 2\" (1.575 m)   Wt 94.5 kg (208 lb 6.4 oz)   LMP 07/01/2009 (Exact Date)   SpO2 99%   BMI 38.12 kg/m²     Physical Exam  Vitals and nursing note reviewed.   Constitutional:       General: She is not in acute distress.     Appearance: She is well-groomed. She is not ill-appearing, toxic-appearing or diaphoretic.      Comments: Appears younger than stated age, extremely pleasant as always   HENT:      Head: Normocephalic and atraumatic.      Nose: Nose normal.      Mouth/Throat:      Lips: Pink.      Mouth: Mucous membranes are moist.      Pharynx: Oropharynx is clear. Uvula midline.   Eyes:      Conjunctiva/sclera: Conjunctivae normal.   Neck:      Thyroid: No thyroid mass, thyromegaly or thyroid tenderness.      Vascular: No JVD.      Trachea: Trachea and phonation normal.   Cardiovascular:      Rate and Rhythm: Normal rate and regular rhythm.      Pulses: Normal pulses.      Heart sounds: Normal heart sounds.   Pulmonary:      Effort: Pulmonary effort is normal.      Breath sounds: Normal breath sounds and air entry.   Abdominal:      General: Bowel sounds are normal.      Palpations: Abdomen is soft. There is no hepatomegaly, splenomegaly or mass.      Tenderness: There is no abdominal tenderness.   Musculoskeletal:        Hands:       Cervical back: Neck supple.      Right lower leg: No edema.      Left lower leg: No edema.   Lymphadenopathy:      Cervical: No cervical adenopathy.   Skin:     General: Skin is warm and dry.      Coloration: Skin is not pale.   Neurological:      Mental Status: She is alert " and oriented to person, place, and time.      Gait: Gait normal.   Psychiatric:         Mood and Affect: Mood normal.         Behavior: Behavior normal. Behavior is cooperative.         Cognition and Memory: Cognition normal.       Administrative Statements

## 2024-05-28 NOTE — PATIENT INSTRUCTIONS
Medicare Preventive Visit Patient Instructions  Thank you for completing your Welcome to Medicare Visit or Medicare Annual Wellness Visit today. Your next wellness visit will be due in one year (5/29/2025).  The screening/preventive services that you may require over the next 5-10 years are detailed below. Some tests may not apply to you based off risk factors and/or age. Screening tests ordered at today's visit but not completed yet may show as past due. Also, please note that scanned in results may not display below.  Preventive Screenings:  Service Recommendations Previous Testing/Comments   Colorectal Cancer Screening  * Colonoscopy    * Fecal Occult Blood Test (FOBT)/Fecal Immunochemical Test (FIT)  * Fecal DNA/Cologuard Test  * Flexible Sigmoidoscopy Age: 45-75 years old   Colonoscopy: every 10 years (may be performed more frequently if at higher risk)  OR  FOBT/FIT: every 1 year  OR  Cologuard: every 3 years  OR  Sigmoidoscopy: every 5 years  Screening may be recommended earlier than age 45 if at higher risk for colorectal cancer. Also, an individualized decision between you and your healthcare provider will decide whether screening between the ages of 76-85 would be appropriate. Colonoscopy: 03/07/2017  FOBT/FIT: Not on file  Cologuard: Not on file  Sigmoidoscopy: Not on file    Screening Current     Breast Cancer Screening Age: 40+ years old  Frequency: every 1-2 years  Not required if history of left and right mastectomy Mammogram: 05/10/2024    Screening Current   Cervical Cancer Screening Between the ages of 21-29, pap smear recommended once every 3 years.   Between the ages of 30-65, can perform pap smear with HPV co-testing every 5 years.   Recommendations may differ for women with a history of total hysterectomy, cervical cancer, or abnormal pap smears in past. Pap Smear: 03/03/2020    Screening Not Indicated   Hepatitis C Screening Once for adults born between 1945 and 1965  More frequently in  patients at high risk for Hepatitis C Hep C Antibody: 04/15/2020    Screening Current   Diabetes Screening 1-2 times per year if you're at risk for diabetes or have pre-diabetes Fasting glucose: 89 mg/dL (5/21/2024)  A1C: No results in last 5 years (No results in last 5 years)  Screening Current   Cholesterol Screening Once every 5 years if you don't have a lipid disorder. May order more often based on risk factors. Lipid panel: 05/21/2024    Screening Not Indicated  History Lipid Disorder     Other Preventive Screenings Covered by Medicare:  Abdominal Aortic Aneurysm (AAA) Screening: covered once if your at risk. You're considered to be at risk if you have a family history of AAA.  Lung Cancer Screening: covers low dose CT scan once per year if you meet all of the following conditions: (1) Age 55-77; (2) No signs or symptoms of lung cancer; (3) Current smoker or have quit smoking within the last 15 years; (4) You have a tobacco smoking history of at least 20 pack years (packs per day multiplied by number of years you smoked); (5) You get a written order from a healthcare provider.  Glaucoma Screening: covered annually if you're considered high risk: (1) You have diabetes OR (2) Family history of glaucoma OR (3)  aged 50 and older OR (4)  American aged 65 and older  Osteoporosis Screening: covered every 2 years if you meet one of the following conditions: (1) You're estrogen deficient and at risk for osteoporosis based off medical history and other findings; (2) Have a vertebral abnormality; (3) On glucocorticoid therapy for more than 3 months; (4) Have primary hyperparathyroidism; (5) On osteoporosis medications and need to assess response to drug therapy.   Last bone density test (DXA Scan): 09/12/2022.  HIV Screening: covered annually if you're between the age of 15-65. Also covered annually if you are younger than 15 and older than 65 with risk factors for HIV infection. For pregnant  patients, it is covered up to 3 times per pregnancy.    Immunizations:  Immunization Recommendations   Influenza Vaccine Annual influenza vaccination during flu season is recommended for all persons aged >= 6 months who do not have contraindications   Pneumococcal Vaccine   * Pneumococcal conjugate vaccine = PCV13 (Prevnar 13), PCV15 (Vaxneuvance), PCV20 (Prevnar 20)  * Pneumococcal polysaccharide vaccine = PPSV23 (Pneumovax) Adults 19-63 yo with certain risk factors or if 65+ yo  If never received any pneumonia vaccine: recommend Prevnar 20 (PCV20)  Give PCV20 if previously received 1 dose of PCV13 or PPSV23   Hepatitis B Vaccine 3 dose series if at intermediate or high risk (ex: diabetes, end stage renal disease, liver disease)   Respiratory syncytial virus (RSV) Vaccine - COVERED BY MEDICARE PART D  * RSVPreF3 (Arexvy) CDC recommends that adults 60 years of age and older may receive a single dose of RSV vaccine using shared clinical decision-making (SCDM)   Tetanus (Td) Vaccine - COST NOT COVERED BY MEDICARE PART B Following completion of primary series, a booster dose should be given every 10 years to maintain immunity against tetanus. Td may also be given as tetanus wound prophylaxis.   Tdap Vaccine - COST NOT COVERED BY MEDICARE PART B Recommended at least once for all adults. For pregnant patients, recommended with each pregnancy.   Shingles Vaccine (Shingrix) - COST NOT COVERED BY MEDICARE PART B  2 shot series recommended in those 19 years and older who have or will have weakened immune systems or those 50 years and older     Health Maintenance Due:      Topic Date Due   • DXA SCAN  09/12/2023   • Breast Cancer Screening: Mammogram  05/10/2025   • Colorectal Cancer Screening  03/07/2027   • Hepatitis C Screening  Completed     Immunizations Due:      Topic Date Due   • COVID-19 Vaccine (3 - 2023-24 season) 09/01/2023     Advance Directives   What are advance directives?  Advance directives are legal  documents that state your wishes and plans for medical care. These plans are made ahead of time in case you lose your ability to make decisions for yourself. Advance directives can apply to any medical decision, such as the treatments you want, and if you want to donate organs.   What are the types of advance directives?  There are many types of advance directives, and each state has rules about how to use them. You may choose a combination of any of the following:  Living will:  This is a written record of the treatment you want. You can also choose which treatments you do not want, which to limit, and which to stop at a certain time. This includes surgery, medicine, IV fluid, and tube feedings.   Durable power of  for healthcare (DPAHC):  This is a written record that states who you want to make healthcare choices for you when you are unable to make them for yourself. This person, called a proxy, is usually a family member or a friend. You may choose more than 1 proxy.  Do not resuscitate (DNR) order:  A DNR order is used in case your heart stops beating or you stop breathing. It is a request not to have certain forms of treatment, such as CPR. A DNR order may be included in other types of advance directives.  Medical directive:  This covers the care that you want if you are in a coma, near death, or unable to make decisions for yourself. You can list the treatments you want for each condition. Treatment may include pain medicine, surgery, blood transfusions, dialysis, IV or tube feedings, and a ventilator (breathing machine).  Values history:  This document has questions about your views, beliefs, and how you feel and think about life. This information can help others choose the care that you would choose.  Why are advance directives important?  An advance directive helps you control your care. Although spoken wishes may be used, it is better to have your wishes written down. Spoken wishes can be  misunderstood, or not followed. Treatments may be given even if you do not want them. An advance directive may make it easier for your family to make difficult choices about your care.   Weight Management   Why it is important to manage your weight:  Being overweight increases your risk of health conditions such as heart disease, high blood pressure, type 2 diabetes, and certain types of cancer. It can also increase your risk for osteoarthritis, sleep apnea, and other respiratory problems. Aim for a slow, steady weight loss. Even a small amount of weight loss can lower your risk of health problems.  How to lose weight safely:  A safe and healthy way to lose weight is to eat fewer calories and get regular exercise. You can lose up about 1 pound a week by decreasing the number of calories you eat by 500 calories each day.   Healthy meal plan for weight management:  A healthy meal plan includes a variety of foods, contains fewer calories, and helps you stay healthy. A healthy meal plan includes the following:  Eat whole-grain foods more often.  A healthy meal plan should contain fiber. Fiber is the part of grains, fruits, and vegetables that is not broken down by your body. Whole-grain foods are healthy and provide extra fiber in your diet. Some examples of whole-grain foods are whole-wheat breads and pastas, oatmeal, brown rice, and bulgur.  Eat a variety of vegetables every day.  Include dark, leafy greens such as spinach, kale, leila greens, and mustard greens. Eat yellow and orange vegetables such as carrots, sweet potatoes, and winter squash.   Eat a variety of fruits every day.  Choose fresh or canned fruit (canned in its own juice or light syrup) instead of juice. Fruit juice has very little or no fiber.  Eat low-fat dairy foods.  Drink fat-free (skim) milk or 1% milk. Eat fat-free yogurt and low-fat cottage cheese. Try low-fat cheeses such as mozzarella and other reduced-fat cheeses.  Choose meat and other  protein foods that are low in fat.  Choose beans or other legumes such as split peas or lentils. Choose fish, skinless poultry (chicken or turkey), or lean cuts of red meat (beef or pork). Before you cook meat or poultry, cut off any visible fat.   Use less fat and oil.  Try baking foods instead of frying them. Add less fat, such as margarine, sour cream, regular salad dressing and mayonnaise to foods. Eat fewer high-fat foods. Some examples of high-fat foods include french fries, doughnuts, ice cream, and cakes.  Eat fewer sweets.  Limit foods and drinks that are high in sugar. This includes candy, cookies, regular soda, and sweetened drinks.  Exercise:  Exercise at least 30 minutes per day on most days of the week. Some examples of exercise include walking, biking, dancing, and swimming. You can also fit in more physical activity by taking the stairs instead of the elevator or parking farther away from stores. Ask your healthcare provider about the best exercise plan for you.      © Copyright [employer] 2018 Information is for End User's use only and may not be sold, redistributed or otherwise used for commercial purposes. All illustrations and images included in CareNotes® are the copyrighted property of A.D.A[employer]M., Inc. or [employer]

## 2024-07-02 ENCOUNTER — APPOINTMENT (OUTPATIENT)
Dept: RADIOLOGY | Facility: CLINIC | Age: 69
End: 2024-07-02
Payer: MEDICARE

## 2024-07-02 ENCOUNTER — OFFICE VISIT (OUTPATIENT)
Dept: OBGYN CLINIC | Facility: CLINIC | Age: 69
End: 2024-07-02
Payer: MEDICARE

## 2024-07-02 VITALS
SYSTOLIC BLOOD PRESSURE: 176 MMHG | HEIGHT: 62 IN | WEIGHT: 208 LBS | HEART RATE: 77 BPM | BODY MASS INDEX: 38.28 KG/M2 | DIASTOLIC BLOOD PRESSURE: 76 MMHG

## 2024-07-02 DIAGNOSIS — M15.8 OSTEOARTHRITIS OF INTERPHALANGEAL JOINT OF RIGHT THUMB: Primary | ICD-10-CM

## 2024-07-02 DIAGNOSIS — M79.644 PAIN OF RIGHT THUMB: ICD-10-CM

## 2024-07-02 DIAGNOSIS — S69.91XS INJURY OF RIGHT THUMB, SEQUELA: ICD-10-CM

## 2024-07-02 PROCEDURE — 99203 OFFICE O/P NEW LOW 30 MIN: CPT | Performed by: ORTHOPAEDIC SURGERY

## 2024-07-02 PROCEDURE — 73140 X-RAY EXAM OF FINGER(S): CPT

## 2024-07-02 NOTE — PROGRESS NOTES
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 Discussed treatment options with patient at time of visit including therapy versus referral to hand surgery. A prescription was provided for physical therapy to address stiffness and improve range of motion of the thumb. She will follow-up in 8 weeks for re-evaluation. The patient expresses understanding and is in agreement with today's treatment plan.     The patient has arthritis of her right thumb.  It looks like she might of fractured to an osteophyte but there is callus formation.  She is agreed to start therapy for range of motion, strengthening and stretching.  Follow-up 3 months evaluation.  If her condition changes, she would not hesitate to let us know      Subjective:   Patient ID: Bertha Segundo  1955     HPI  Patient is a 69 y.o. female who presents for initial evaluation of her right thumb. The patient states that several months ago, she was playing fetch with her dog. As she attempted to grab the ball from the dog, he released the ball unexpectedly, and she jammed her thumb into the kitchen island. She reports achy pain with increased stiffness since the time of injury.     The following portions of the patient's history were reviewed and updated as appropriate:  Past medical history, past surgical history, Family history, social history, current medications and allergies    Past Medical History:    Diagnosis Date    Melanoma in situ of left shoulder (HCC) 05/28/2024    Morbid obesity (HCC) 05/04/2021       Past Surgical History:   Procedure Laterality Date    BREAST BIOPSY Left 02/21/2017    benign    BREAST CYST EXCISION Left     benign    MOHS SURGERY      SALPINGOOPHORECTOMY Left 1973    WISDOM TOOTH EXTRACTION         Family History   Problem Relation Age of Onset    Hypertension Mother     Heart disease Father     Arthritis Father     Asthma Father     Lung cancer Father     No Known Problems Daughter     No Known Problems Maternal Grandmother     No Known Problems Maternal Grandfather     No Known Problems Paternal Grandmother     No Known Problems Paternal Grandfather     No Known Problems Maternal Aunt     No Known Problems Paternal Aunt     No Known Problems Paternal Aunt     Cancer Neg Hx     BRCA2 Positive Neg Hx     BRCA2 Negative Neg Hx     BRCA1 Positive Neg Hx     BRCA1 Negative Neg Hx     BRCA 1/2 Neg Hx     Endometrial cancer Neg Hx     Ovarian cancer Neg Hx     Colon cancer Neg Hx     Breast cancer additional onset Neg Hx     Breast cancer Neg Hx        Social History     Socioeconomic History    Marital status: /Civil Union     Spouse name: None    Number of children: None    Years of education: None    Highest education level: None   Occupational History    None   Tobacco Use    Smoking status: Former    Smokeless tobacco: Never   Vaping Use    Vaping status: Never Used   Substance and Sexual Activity    Alcohol use: Yes     Comment: couple glasses of wine a week    Drug use: Never    Sexual activity: Yes     Partners: Male     Birth control/protection: Post-menopausal   Other Topics Concern    None   Social History Narrative    None     Social Determinants of Health     Financial Resource Strain: Low Risk  (5/26/2023)    Overall Financial Resource Strain (CARDIA)     Difficulty of Paying Living Expenses: Not hard at all   Food Insecurity: No Food Insecurity (5/21/2024)    Hunger  Vital Sign     Worried About Running Out of Food in the Last Year: Never true     Ran Out of Food in the Last Year: Never true   Transportation Needs: No Transportation Needs (5/21/2024)    PRAPARE - Transportation     Lack of Transportation (Medical): No     Lack of Transportation (Non-Medical): No   Physical Activity: Not on file   Stress: Not on file   Social Connections: Not on file   Intimate Partner Violence: Not on file   Housing Stability: Low Risk  (5/21/2024)    Housing Stability Vital Sign     Unable to Pay for Housing in the Last Year: No     Number of Times Moved in the Last Year: 0     Homeless in the Last Year: No         Current Outpatient Medications:     amLODIPine-benazepril (LOTREL 5-20) 5-20 MG per capsule, TAKE 1 CAPSULE BY MOUTH EVERY DAY, Disp: 90 capsule, Rfl: 0    aspirin 81 MG tablet, Take 81 mg by mouth daily, Disp: , Rfl:     Calcium Carb-Cholecalciferol (CALCIUM-VITAMIN D) 600-400 MG-UNIT TABS, Take 1 tablet by mouth daily, Disp: , Rfl:     cholecalciferol (VITAMIN D3) 1,000 units tablet, Take 1,000 Units by mouth daily, Disp: , Rfl:     Coenzyme Q10 (CO Q 10) 100 MG CAPS, Take 1 capsule by mouth daily, Disp: , Rfl:     Flaxseed, Linseed, (FLAXSEED OIL) 1000 MG CAPS, Take 1 capsule by mouth daily, Disp: , Rfl:     hydrochlorothiazide (HYDRODIURIL) 25 mg tablet, take 1 tablet by mouth once daily, Disp: 90 tablet, Rfl: 1    Multiple Vitamin (MULTIVITAMIN) tablet, Take 1 tablet by mouth daily, Disp: , Rfl:     pravastatin (PRAVACHOL) 10 mg tablet, Take 1 tablet (10 mg total) by mouth every morning, Disp: 90 tablet, Rfl: 1    Allergies   Allergen Reactions    Clindamycin Diarrhea and GI Intolerance     Other reaction(s): Weakness   Severe stomach pains, muscle aches  Abdominal pains. weakness      Rosuvastatin Myalgia     Myalgias  Leg cramps      Simvastatin      Myalgias  Muscle spasms         Review of Systems   Constitutional:  Negative for chills, fever and unexpected weight change.  "  HENT:  Negative for hearing loss, nosebleeds and sore throat.    Eyes:  Negative for pain, redness and visual disturbance.   Respiratory:  Negative for cough, shortness of breath and wheezing.    Cardiovascular:  Negative for chest pain, palpitations and leg swelling.   Gastrointestinal:  Negative for abdominal pain, nausea and vomiting.   Endocrine: Negative for polydipsia and polyuria.   Genitourinary:  Negative for dysuria and hematuria.   Skin:  Negative for rash and wound.   Neurological:  Negative for dizziness, numbness and headaches.   Psychiatric/Behavioral:  Negative for decreased concentration and suicidal ideas. The patient is not nervous/anxious.    All other systems reviewed and are negative.       Objective:  BP (!) 176/76   Pulse 77   Ht 5' 2\" (1.575 m)   Wt 94.3 kg (208 lb)   LMP 07/01/2009 (Exact Date)   BMI 38.04 kg/m²     Ortho Exam  right Hand -    Patient presents with no obvious anatomical deformity  Skin is warm and dry to touch with no signs of erythema, ecchymosis, or infection  No soft tissue swelling or effusion noted  Full FDS, FDP, extensor mechanisms are intact  No rotational deformity with composite finger flexion  TTP over IP joint of the thumb   No Reproducible triggering on exam  Demonstrates normal wrist, elbow, and shoulder motion  Forearm compartments are soft and supple  2+ distal radial pulse with brisk capillary refill to the fingers  Radial, median, and ulnar motor and sensory distribution intact  Sensations light to touch intact distally      Physical Exam  HENT:      Head: Normocephalic and atraumatic.      Nose: Nose normal.   Eyes:      Conjunctiva/sclera: Conjunctivae normal.   Cardiovascular:      Rate and Rhythm: Normal rate.   Pulmonary:      Effort: Pulmonary effort is normal.   Musculoskeletal:      Cervical back: Neck supple.   Skin:     General: Skin is warm and dry.      Capillary Refill: Capillary refill takes less than 2 seconds.   Neurological:      " Mental Status: She is alert and oriented to person, place, and time.   Psychiatric:         Mood and Affect: Mood normal.         Behavior: Behavior normal.          Diagnostic Test Review:    X-Ray of right thumb obtained on 7/2/2024 were reviewed and demonstrates severe osteoarthritis of her interphalangeal joint with large osteophyte presence.      Procedures   None performed.     Scribe Attestation      I,:  Libia Robert am acting as a scribe while in the presence of the attending physician.:       I,:  Navid Frey DO personally performed the services described in this documentation    as scribed in my presence.:

## 2024-07-08 ENCOUNTER — EVALUATION (OUTPATIENT)
Dept: OCCUPATIONAL THERAPY | Facility: CLINIC | Age: 69
End: 2024-07-08
Payer: MEDICARE

## 2024-07-08 DIAGNOSIS — M15.8 OSTEOARTHRITIS OF INTERPHALANGEAL JOINT OF RIGHT THUMB: Primary | ICD-10-CM

## 2024-07-08 PROCEDURE — 97140 MANUAL THERAPY 1/> REGIONS: CPT

## 2024-07-08 PROCEDURE — 97166 OT EVAL MOD COMPLEX 45 MIN: CPT

## 2024-07-08 PROCEDURE — 97018 PARAFFIN BATH THERAPY: CPT

## 2024-07-08 NOTE — PROGRESS NOTES
OT Evaluation     Today's date: 2024  Patient name: Bertha Segundo  : 1955  MRN: [id number]  Referring provider: Navid Frey DO  Dx:   Encounter Diagnosis     ICD-10-CM    1. Osteoarthritis of interphalangeal joint of right thumb  M15.8                      Assessment  Impairments: abnormal or restricted ROM, activity intolerance, impaired physical strength and pain with function  Symptom irritability: moderate    Assessment details: Patient presenting to OP OT services with a dx of . Patient reports symptoms began three months since hitting thumb on underside of table. Patient's last appointment with referring MD was on 2024.  Patient reports X-Rays showed severe OA of right thumb. Patient reports symptoms include pain with thumb movement, decreased fine motor coordination and tenderness along IP of right thumb.  Patient is right hand dominant.   Understanding of Dx/Px/POC: good     Prognosis: good    Goals  STGs    Pt will increase  strength by 5-10#. - Not Met    Pt will increase digit ROM by 50%. - Not Met    Pt will demonstrate decrease in edema by 25%. - Not Met    Independent with HEP. - Not Met    Pt will reports no more than half of current pain rate with activity. - Not Met     LTGs     Pt will increase  strength by an additional 5-10#. - Not Met    Pt will increase digit ROM to WNL. - Not Met    Pt will demonstrate decrease in edema by 50%. - Not Met    Pt will increase pinch strength by 3-5#. - Not Met    Pt will report an increase in ADL/IADL participation. - Not Met    Pt will report no more than a 0/10 pain with activity - Not Met    Pt will increase fine motor coordination as evident by an improvement in 9-hole peg test by 2 seconds. - Not Met           Plan  Patient would benefit from: OT eval, skilled occupational therapy and custom splinting  Referral necessary: Yes  Planned modality interventions: ultrasound, thermotherapy: hydrocollator packs, cryotherapy,  thermotherapy: paraffin bath and fluidotherapy    Planned therapy interventions: activity modification, IADL retraining, massage, manual therapy, patient education, neuromuscular re-education, self care, strengthening, stretching, therapeutic activities, therapeutic exercise, home exercise program, fine motor coordination training, IASTM, joint mobilization and kinesiology taping    Frequency: 1-2x week  Duration in weeks: 4  Treatment plan discussed with: patient  Plan details: Patient has presenting to OP OT services with a dx of right thumb pain. Patient demonstrating increased pain, decreased strength, decreased ROM and decreased activity. Pt would benefit from continued Occupational Therapy services one times per week for 4 weeks to return to prior level of function and achieve all established goals. Thank you for the referral!          Subjective Evaluation    History of Present Illness  Onset date: Three Months Ago.  Mechanism of injury: Right Thumb OA          Not a recurrent problem   Quality of life: good    Patient Goals  Patient goals for therapy: decreased edema, decreased pain, increased motion, increased strength and independence with ADLs/IADLs    Pain  Current pain rating: 3  At best pain rating: 3  At worst pain ratin  Location: Right Thumb    Social Support    Employment status: not working  Hand dominance: right      Diagnostic Tests  X-ray: abnormal  Treatments  Current treatment: occupational therapy      Objective     Neurological Testing     Sensation     Wrist/Hand   Left   Intact: light touch    Right   Intact: light touch    Additional Neurological Details  Achyness with bending of right thumb    Active Range of Motion     Left Wrist   Normal active range of motion    Right Wrist   Normal active range of motion    Left Thumb   Flexion     CMC: 35 degrees    MP: 30 degrees    DIP: 50 degrees  Palmar Abduction     CMC: 60 degrees    Opposition: WNL    Right Thumb   Flexion     CMC: 35     MP: 45    DIP: 15  Palmar Abduction    CMC: 70  Opposition: WNL    Left Digits    Flexion   Index     DIP: -15    Right Digits   Flexion   Index     DIP: -10    Additional Active Range of Motion Details  Composite fist noted    Strength/Myotome Testing     Left Wrist/Hand   Wrist extension: 3  Wrist flexion: 3  Radial deviation: 3  Ulnar deviation: 3     (2nd hand position)     Trial 1: 35    Thumb Strength  Key/Lateral Pinch     Trial 1: 11    Right Wrist/Hand   Wrist extension: 3  Wrist flexion: 3  Radial deviation: 3  Ulnar deviation: 3     (2nd hand position)     Trial 1: 30    Thumb Strength   Key/Lateral Pinch     Trial 1: 4    Additional Strength Details  Decrease in right pinch strength as compared to left with pain noted    Swelling     Left Wrist/Hand   Thumb     Distal: 6.8 cm    Right Wrist/Hand   Thumb     Distal: 7 cm    Additional Swelling Details  Slight increase in swelling compared to left  Neuro Exam:     Functional outcomes   Left 9 peg hole test: 19.30 (seconds)  Right 9 hole peg test: 17.90 (seconds)                Precautions: Right Thumb OA  Initial Evaluation completed on: 07/08/2024  Re-Evaluation needs to be completed before: 08/08/2024  Insurance: Medicare       FOTO COUNT 07/08/2024       RE-EVAL 08/08/2024       Manuals 07/08/2024       IASTM GT#3  GT#6  5'       Digit Flexor Stretch IP 30 sec x 3  Joint Mobs       KT Tape                                Neuro Re-Ed  07/08/2024                                               Ther Ex 07/08/2024       Rubberband  Thumb Flexion  Thumb Ext  Radial Add  Radial Abd  Finger Abd        IP Blocking HEP w/ Splint       Digi-Flex        Theraputty  Grasps  Pinches  Thumb Presses        Hand Power Pro        Thumb [place]                Ther Activity 07/08/2024       Tension Pin Pom Pom        Grooved Peg Board                                Modalities 07/08/2024       Ultrasound        Paraffin w/ MH 10' end of session                Fluidotherapy        Splinting

## 2024-07-16 ENCOUNTER — OFFICE VISIT (OUTPATIENT)
Dept: OCCUPATIONAL THERAPY | Facility: CLINIC | Age: 69
End: 2024-07-16
Payer: MEDICARE

## 2024-07-16 DIAGNOSIS — M15.8 OSTEOARTHRITIS OF INTERPHALANGEAL JOINT OF RIGHT THUMB: Primary | ICD-10-CM

## 2024-07-16 PROCEDURE — 97110 THERAPEUTIC EXERCISES: CPT

## 2024-07-16 PROCEDURE — 97140 MANUAL THERAPY 1/> REGIONS: CPT

## 2024-07-16 NOTE — PROGRESS NOTES
"Daily Note     Today's date: 2024  Patient name: Bertha Segundo  : 1955  MRN: [id number]  Referring provider: Navid Frey DO  Dx:   Encounter Diagnosis     ICD-10-CM    1. Osteoarthritis of interphalangeal joint of right thumb  M15.8                      Subjective: \"I have been moving it.\"      Objective: See treatment diary below      Assessment: Tolerated treatment well. Patient exhibited good technique with therapeutic exercises and would benefit from continued OT. Patient reports compliance with HEP since start of care. Patient has attempted use heat at home as well. Patient reports minimal discomfort with joint mobilizations this date in IP joint.       Plan: Continue per plan of care.  Progress treatment as tolerated.          Precautions: Right Thumb OA  Initial Evaluation completed on: 2024  Re-Evaluation needs to be completed before: 2024  Insurance: Medicare       FOTO COUNT 2024       RE-EVAL 2024       Manuals 2024      IASTM GT#3  GT#6  5' GT#3  GT#6  5'      Digit Flexor Stretch IP 30 sec x 3  Joint Mobs IP 30 sec x 3  Joint Mobs      KT Tape                                Neuro Re-Ed  2024                                              Ther Ex 2024      Rubberband  Thumb Flexion  Thumb Ext  Radial Add  Radial Abd  Finger Abd  Red RB  X 20    X 20      Opposition  Blue Ball 5 sec x 10      IP Blocking HEP w/ Splint W/ Hand x 20      Digi-Flex        Theraputty  Grasps  Pinches  Thumb Presses        Hand Power Pro        Thumb [place]  One Red RB x 10              Ther Activity 2024      Tension Pin Pom Pom  Yellow TP      Grooved Peg Board  1:29                              Modalities 2024      Ultrasound  3.3 Mhz  50%  0.8  10'      Paraffin w/ MH 10' end of session 10' end of session      MH        Fluidotherapy        Splinting                 "

## 2024-07-21 DIAGNOSIS — I10 BENIGN ESSENTIAL HYPERTENSION: ICD-10-CM

## 2024-07-21 RX ORDER — HYDROCHLOROTHIAZIDE 25 MG/1
25 TABLET ORAL DAILY
Qty: 100 TABLET | Refills: 1 | Status: SHIPPED | OUTPATIENT
Start: 2024-07-21

## 2024-07-23 ENCOUNTER — OFFICE VISIT (OUTPATIENT)
Dept: OCCUPATIONAL THERAPY | Facility: CLINIC | Age: 69
End: 2024-07-23
Payer: MEDICARE

## 2024-07-23 DIAGNOSIS — M15.8 OSTEOARTHRITIS OF INTERPHALANGEAL JOINT OF RIGHT THUMB: Primary | ICD-10-CM

## 2024-07-23 PROCEDURE — 97110 THERAPEUTIC EXERCISES: CPT

## 2024-07-23 PROCEDURE — 97140 MANUAL THERAPY 1/> REGIONS: CPT

## 2024-07-23 NOTE — PROGRESS NOTES
"Daily Note     Today's date: 2024  Patient name: Bertha Segundo  : 1955  MRN: [id number]  Referring provider: Navid Frey DO  Dx:   Encounter Diagnosis     ICD-10-CM    1. Osteoarthritis of interphalangeal joint of right thumb  M15.8                      Subjective: \"I have been doing my exercises.\"      Objective: See treatment diary below      Assessment: Tolerated treatment well. Patient exhibited good technique with therapeutic exercises and would benefit from continued OT. Patient reports compliance with HEP since start of care. Patient tolerated additional progressive exercises this date to increase hand strength and functional use. Patient would like to continue additional therapy sessions to increase functional use and ROM.       Plan: Continue per plan of care.  Progress treatment as tolerated.       Precautions: Right Thumb OA  Initial Evaluation completed on: 2024  Re-Evaluation needs to be completed before: 2024  Insurance: Medicare       FOTO COUNT 2024       RE-EVAL 2024       Manuals 2024     IASTM GT#3  GT#6  5' GT#3  GT#6  5' GT#3  GT#6  5'     Digit Flexor Stretch IP 30 sec x 3  Joint Mobs IP 30 sec x 3  Joint Mobs IP 30 sec x 3  Joint Mobs     KT Tape                                Neuro Re-Ed  2024                                             Ther Ex 2024     Rubberband  Thumb Flexion  Thumb Ext  Radial Add  Radial Abd  Finger Abd  Red RB  X 20    X 20 Red RB  X 20    X 20     Opposition  Blue Ball 5 sec x 10 Blue Ball 5 sec x 10     IP Blocking HEP w/ Splint W/ Hand x 20 W/ Hand x 20     Digi-Flex        Theraputty  Grasps  Pinches  Thumb Presses  Object Find   Green          Completed     Hand Power Pro        Thumb [place]  One Red RB x 10 One Red RB x 10             Ther Activity 2024     Tension Pin Pom Pom  Yellow TP Yellow TP   "   Grooved Peg Board  1:29 1:25     Beads Tennis Ball   X 1                     Modalities 07/08/2024 07/16/2024 07/23/2024     Ultrasound  3.3 Mhz  50%  0.8  10' 3.3 Mhz  50%  0.8  10'     Paraffin w/ MH 10' end of session 10' end of session 10' end of session     MH        Fluidotherapy        Splinting

## 2024-07-30 ENCOUNTER — OFFICE VISIT (OUTPATIENT)
Dept: OCCUPATIONAL THERAPY | Facility: CLINIC | Age: 69
End: 2024-07-30
Payer: MEDICARE

## 2024-07-30 DIAGNOSIS — M15.8 OSTEOARTHRITIS OF INTERPHALANGEAL JOINT OF RIGHT THUMB: Primary | ICD-10-CM

## 2024-07-30 PROCEDURE — 97140 MANUAL THERAPY 1/> REGIONS: CPT

## 2024-07-30 PROCEDURE — 97110 THERAPEUTIC EXERCISES: CPT

## 2024-07-30 NOTE — PROGRESS NOTES
"Daily Note/Discharge    Today's date: 2024  Patient name: Bertha Segundo  : 1955  MRN: [id number]  Referring provider: Navid Frey DO  Dx:   Encounter Diagnosis     ICD-10-CM    1. Osteoarthritis of interphalangeal joint of right thumb  M15.8                      Subjective: \"I feels really good.\"      Objective: See treatment diary below      Assessment: Tolerated treatment well. Patient exhibited good technique with therapeutic exercises. Patient reports a decrease in symptoms since last session. Patient reports compliance with HEP. Patient and therapist discussed use of paraffin at home to manage future symptoms. Patient in agreement to be discharged to [facility] this date.       Plan: D/C OT services     Precautions: Right Thumb OA  Initial Evaluation completed on: 2024  Re-Evaluation needs to be completed before: 2024  Insurance: Medicare       FOTO COUNT 2024       RE-EVAL 2024       Manuals 2024    IASTM GT#3  GT#6  5' GT#3  GT#6  5' GT#3  GT#6  5' GT#3  GT#6  5'    Digit Flexor Stretch IP 30 sec x 3  Joint Mobs IP 30 sec x 3  Joint Mobs IP 30 sec x 3  Joint Mobs IP 30 sec x 3  Joint Mobs    KT Tape                                Neuro Re-Ed  2024                                            Ther Ex 2024    Rubberband  Thumb Flexion  Thumb Ext  Radial Add  Radial Abd  Finger Abd  Red RB  X 20    X 20 Red RB  X 20    X 20 Red RB  X 20    X 20    Opposition  Blue Ball 5 sec x 10 Blue Ball 5 sec x 10 Blue Ball 5 sec x 10    IP Blocking HEP w/ Splint W/ Hand x 20 W/ Hand x 20     Digi-Flex        Theraputty  Grasps  Pinches  Thumb Presses  Object Find   Green          Completed Green          Completed    Hand Power Pro        Thumb [place]  One Red RB x 10 One Red RB x 10 One Orange RB x 10            Ther Activity 2024  "   Tension Pin Pom Pom  Yellow TP Yellow TP Red TP    Grooved Peg Board  1:29 1:25 1:25    Beads Tennis Ball   X 1 X 1                    Modalities 07/08/2024 07/16/2024 07/23/2024 07/30/2023    Ultrasound  3.3 Mhz  50%  0.8  10' 3.3 Mhz  50%  0.8  10' 3.3 Mhz  50%  0.8  10'    Paraffin w/ MH 10' end of session 10' end of session 10' end of session 10' end of session    MH        Fluidotherapy        Splinting

## 2024-08-02 DIAGNOSIS — I10 BENIGN ESSENTIAL HYPERTENSION: ICD-10-CM

## 2024-08-02 RX ORDER — AMLODIPINE BESYLATE AND BENAZEPRIL HYDROCHLORIDE 5; 20 MG/1; MG/1
1 CAPSULE ORAL DAILY
Qty: 100 CAPSULE | Refills: 1 | Status: SHIPPED | OUTPATIENT
Start: 2024-08-02

## 2024-08-15 DIAGNOSIS — E78.2 MIXED HYPERLIPIDEMIA: ICD-10-CM

## 2024-08-15 RX ORDER — PRAVASTATIN SODIUM 10 MG
10 TABLET ORAL EVERY MORNING
Qty: 90 TABLET | Refills: 1 | Status: SHIPPED | OUTPATIENT
Start: 2024-08-15

## 2024-09-16 ENCOUNTER — HOSPITAL ENCOUNTER (OUTPATIENT)
Dept: RADIOLOGY | Facility: CLINIC | Age: 69
Discharge: HOME | End: 2024-09-16
Payer: MEDICARE

## 2024-09-16 DIAGNOSIS — M85.80 OSTEOPENIA, UNSPECIFIED LOCATION: ICD-10-CM

## 2024-09-16 DIAGNOSIS — Z78.0 POSTMENOPAUSAL: ICD-10-CM

## 2024-09-16 PROCEDURE — 77080 DXA BONE DENSITY AXIAL: CPT

## 2025-01-15 DIAGNOSIS — I10 BENIGN ESSENTIAL HYPERTENSION: ICD-10-CM

## 2025-01-15 RX ORDER — HYDROCHLOROTHIAZIDE 25 MG/1
25 TABLET ORAL DAILY
Qty: 90 TABLET | Refills: 1 | Status: SHIPPED | OUTPATIENT
Start: 2025-01-15

## 2025-01-25 DIAGNOSIS — I10 BENIGN ESSENTIAL HYPERTENSION: ICD-10-CM

## 2025-01-27 RX ORDER — AMLODIPINE AND BENAZEPRIL HYDROCHLORIDE 5; 20 MG/1; MG/1
1 CAPSULE ORAL DAILY
Qty: 90 CAPSULE | Refills: 1 | Status: SHIPPED | OUTPATIENT
Start: 2025-01-27

## 2025-02-12 DIAGNOSIS — E78.2 MIXED HYPERLIPIDEMIA: ICD-10-CM

## 2025-02-12 RX ORDER — PRAVASTATIN SODIUM 10 MG
10 TABLET ORAL EVERY MORNING
Qty: 90 TABLET | Refills: 0 | Status: SHIPPED | OUTPATIENT
Start: 2025-02-12

## 2025-05-05 ENCOUNTER — TELEPHONE (OUTPATIENT)
Age: 70
End: 2025-05-05

## 2025-05-05 NOTE — TELEPHONE ENCOUNTER
Patient asking if she needs labs to be done prior to her upcoming appt. Please enter if needed and notify patient. She also needs her mammo screening ordered as well. Please review.

## 2025-05-11 DIAGNOSIS — E78.2 MIXED HYPERLIPIDEMIA: ICD-10-CM

## 2025-05-11 RX ORDER — PRAVASTATIN SODIUM 10 MG
10 TABLET ORAL EVERY MORNING
Qty: 90 TABLET | Refills: 0 | Status: SHIPPED | OUTPATIENT
Start: 2025-05-11

## 2025-05-20 DIAGNOSIS — Z13.0 SCREENING FOR DEFICIENCY ANEMIA: ICD-10-CM

## 2025-05-20 DIAGNOSIS — Z13.29 THYROID DISORDER SCREEN: ICD-10-CM

## 2025-05-20 DIAGNOSIS — Z13.21 ENCOUNTER FOR VITAMIN DEFICIENCY SCREENING: ICD-10-CM

## 2025-05-20 DIAGNOSIS — E78.2 MIXED HYPERLIPIDEMIA: Primary | ICD-10-CM

## 2025-05-20 DIAGNOSIS — Z79.899 ENCOUNTER FOR LONG-TERM (CURRENT) USE OF MEDICATIONS: ICD-10-CM

## 2025-05-20 DIAGNOSIS — Z12.31 BREAST CANCER SCREENING BY MAMMOGRAM: Primary | ICD-10-CM

## 2025-05-20 DIAGNOSIS — M85.80 OSTEOPENIA, UNSPECIFIED LOCATION: ICD-10-CM

## 2025-05-20 DIAGNOSIS — I10 BENIGN ESSENTIAL HYPERTENSION: ICD-10-CM

## 2025-05-20 DIAGNOSIS — Z86.39 HISTORY OF VITAMIN D DEFICIENCY: ICD-10-CM

## 2025-05-23 ENCOUNTER — RA CDI HCC (OUTPATIENT)
Dept: OTHER | Facility: HOSPITAL | Age: 70
End: 2025-05-23

## 2025-05-23 ENCOUNTER — APPOINTMENT (OUTPATIENT)
Dept: LAB | Facility: CLINIC | Age: 70
End: 2025-05-23
Payer: MEDICARE

## 2025-05-23 DIAGNOSIS — Z13.29 THYROID DISORDER SCREEN: ICD-10-CM

## 2025-05-23 DIAGNOSIS — Z86.39 HISTORY OF VITAMIN D DEFICIENCY: ICD-10-CM

## 2025-05-23 DIAGNOSIS — Z79.899 ENCOUNTER FOR LONG-TERM (CURRENT) USE OF MEDICATIONS: ICD-10-CM

## 2025-05-23 DIAGNOSIS — E78.2 MIXED HYPERLIPIDEMIA: ICD-10-CM

## 2025-05-23 DIAGNOSIS — Z13.21 ENCOUNTER FOR VITAMIN DEFICIENCY SCREENING: ICD-10-CM

## 2025-05-23 DIAGNOSIS — Z13.0 SCREENING FOR DEFICIENCY ANEMIA: ICD-10-CM

## 2025-05-23 DIAGNOSIS — I10 BENIGN ESSENTIAL HYPERTENSION: ICD-10-CM

## 2025-05-23 DIAGNOSIS — M85.80 OSTEOPENIA, UNSPECIFIED LOCATION: ICD-10-CM

## 2025-05-23 LAB
25(OH)D3 SERPL-MCNC: 31.6 NG/ML (ref 30–100)
ALBUMIN SERPL BCG-MCNC: 4.3 G/DL (ref 3.5–5)
ALP SERPL-CCNC: 80 U/L (ref 34–104)
ALT SERPL W P-5'-P-CCNC: 17 U/L (ref 7–52)
ANION GAP SERPL CALCULATED.3IONS-SCNC: 10 MMOL/L (ref 4–13)
AST SERPL W P-5'-P-CCNC: 21 U/L (ref 13–39)
BASOPHILS # BLD AUTO: 0.07 THOUSANDS/ÂΜL (ref 0–0.1)
BASOPHILS NFR BLD AUTO: 1 % (ref 0–1)
BILIRUB SERPL-MCNC: 0.53 MG/DL (ref 0.2–1)
BUN SERPL-MCNC: 14 MG/DL (ref 5–25)
CALCIUM SERPL-MCNC: 10 MG/DL (ref 8.4–10.2)
CHLORIDE SERPL-SCNC: 104 MMOL/L (ref 96–108)
CHOLEST SERPL-MCNC: 199 MG/DL (ref ?–200)
CO2 SERPL-SCNC: 29 MMOL/L (ref 21–32)
CREAT SERPL-MCNC: 0.6 MG/DL (ref 0.6–1.3)
CREAT UR-MCNC: 228.8 MG/DL
EOSINOPHIL # BLD AUTO: 0.21 THOUSAND/ÂΜL (ref 0–0.61)
EOSINOPHIL NFR BLD AUTO: 4 % (ref 0–6)
ERYTHROCYTE [DISTWIDTH] IN BLOOD BY AUTOMATED COUNT: 13.5 % (ref 11.6–15.1)
GFR SERPL CREATININE-BSD FRML MDRD: 91 ML/MIN/1.73SQ M
GLUCOSE P FAST SERPL-MCNC: 88 MG/DL (ref 65–99)
HCT VFR BLD AUTO: 41.5 % (ref 34.8–46.1)
HDLC SERPL-MCNC: 70 MG/DL
HGB BLD-MCNC: 13.3 G/DL (ref 11.5–15.4)
IMM GRANULOCYTES # BLD AUTO: 0.01 THOUSAND/UL (ref 0–0.2)
IMM GRANULOCYTES NFR BLD AUTO: 0 % (ref 0–2)
LDLC SERPL CALC-MCNC: 113 MG/DL (ref 0–100)
LYMPHOCYTES # BLD AUTO: 1.92 THOUSANDS/ÂΜL (ref 0.6–4.47)
LYMPHOCYTES NFR BLD AUTO: 34 % (ref 14–44)
MCH RBC QN AUTO: 29.4 PG (ref 26.8–34.3)
MCHC RBC AUTO-ENTMCNC: 32 G/DL (ref 31.4–37.4)
MCV RBC AUTO: 92 FL (ref 82–98)
MICROALBUMIN UR-MCNC: 20.2 MG/L
MICROALBUMIN/CREAT 24H UR: 9 MG/G CREATININE (ref 0–30)
MONOCYTES # BLD AUTO: 0.43 THOUSAND/ÂΜL (ref 0.17–1.22)
MONOCYTES NFR BLD AUTO: 8 % (ref 4–12)
NEUTROPHILS # BLD AUTO: 2.98 THOUSANDS/ÂΜL (ref 1.85–7.62)
NEUTS SEG NFR BLD AUTO: 53 % (ref 43–75)
NRBC BLD AUTO-RTO: 0 /100 WBCS
PLATELET # BLD AUTO: 290 THOUSANDS/UL (ref 149–390)
PMV BLD AUTO: 10.8 FL (ref 8.9–12.7)
POTASSIUM SERPL-SCNC: 4.2 MMOL/L (ref 3.5–5.3)
PROT SERPL-MCNC: 7.3 G/DL (ref 6.4–8.4)
RBC # BLD AUTO: 4.53 MILLION/UL (ref 3.81–5.12)
SODIUM SERPL-SCNC: 143 MMOL/L (ref 135–147)
TRIGL SERPL-MCNC: 80 MG/DL (ref ?–150)
TSH SERPL DL<=0.05 MIU/L-ACNC: 3.16 UIU/ML (ref 0.45–4.5)
WBC # BLD AUTO: 5.62 THOUSAND/UL (ref 4.31–10.16)

## 2025-05-23 PROCEDURE — 82570 ASSAY OF URINE CREATININE: CPT

## 2025-05-23 PROCEDURE — 85025 COMPLETE CBC W/AUTO DIFF WBC: CPT

## 2025-05-23 PROCEDURE — 82306 VITAMIN D 25 HYDROXY: CPT

## 2025-05-23 PROCEDURE — 36415 COLL VENOUS BLD VENIPUNCTURE: CPT

## 2025-05-23 PROCEDURE — 80061 LIPID PANEL: CPT

## 2025-05-23 PROCEDURE — 84443 ASSAY THYROID STIM HORMONE: CPT

## 2025-05-23 PROCEDURE — 80053 COMPREHEN METABOLIC PANEL: CPT

## 2025-05-23 PROCEDURE — 82043 UR ALBUMIN QUANTITATIVE: CPT

## 2025-05-29 ENCOUNTER — OFFICE VISIT (OUTPATIENT)
Dept: FAMILY MEDICINE CLINIC | Facility: CLINIC | Age: 70
End: 2025-05-29
Payer: MEDICARE

## 2025-05-29 VITALS
OXYGEN SATURATION: 99 % | SYSTOLIC BLOOD PRESSURE: 120 MMHG | RESPIRATION RATE: 18 BRPM | HEIGHT: 62 IN | BODY MASS INDEX: 38.79 KG/M2 | WEIGHT: 210.8 LBS | DIASTOLIC BLOOD PRESSURE: 84 MMHG | HEART RATE: 63 BPM | TEMPERATURE: 97.1 F

## 2025-05-29 DIAGNOSIS — R10.32 LLQ DISCOMFORT: ICD-10-CM

## 2025-05-29 DIAGNOSIS — R14.0 BLOATING SYMPTOM: ICD-10-CM

## 2025-05-29 DIAGNOSIS — K57.30 DIVERTICULOSIS OF COLON: ICD-10-CM

## 2025-05-29 DIAGNOSIS — Z00.00 MEDICARE ANNUAL WELLNESS VISIT, SUBSEQUENT: Primary | ICD-10-CM

## 2025-05-29 DIAGNOSIS — E78.2 MIXED HYPERLIPIDEMIA: ICD-10-CM

## 2025-05-29 DIAGNOSIS — I10 BENIGN ESSENTIAL HYPERTENSION: ICD-10-CM

## 2025-05-29 PROCEDURE — G0439 PPPS, SUBSEQ VISIT: HCPCS | Performed by: FAMILY MEDICINE

## 2025-05-29 PROCEDURE — 99214 OFFICE O/P EST MOD 30 MIN: CPT | Performed by: FAMILY MEDICINE

## 2025-05-29 PROCEDURE — G2211 COMPLEX E/M VISIT ADD ON: HCPCS | Performed by: FAMILY MEDICINE

## 2025-05-29 NOTE — PATIENT INSTRUCTIONS
Medicare Preventive Visit Patient Instructions  Thank you for completing your Welcome to Medicare Visit or Medicare Annual Wellness Visit today. Your next wellness visit will be due in one year (5/30/2026).  The screening/preventive services that you may require over the next 5-10 years are detailed below. Some tests may not apply to you based off risk factors and/or age. Screening tests ordered at today's visit but not completed yet may show as past due. Also, please note that scanned in results may not display below.  Preventive Screenings:  Service Recommendations Previous Testing/Comments   Colorectal Cancer Screening  * Colonoscopy    * Fecal Occult Blood Test (FOBT)/Fecal Immunochemical Test (FIT)  * Fecal DNA/Cologuard Test  * Flexible Sigmoidoscopy Age: 45-75 years old   Colonoscopy: every 10 years (may be performed more frequently if at higher risk)  OR  FOBT/FIT: every 1 year  OR  Cologuard: every 3 years  OR  Sigmoidoscopy: every 5 years  Screening may be recommended earlier than age 45 if at higher risk for colorectal cancer. Also, an individualized decision between you and your healthcare provider will decide whether screening between the ages of 76-85 would be appropriate. Colonoscopy: 03/07/2017  FOBT/FIT: Not on file  Cologuard: Not on file  Sigmoidoscopy: Not on file    Screening Current     Breast Cancer Screening Age: 40+ years old  Frequency: every 1-2 years  Not required if history of left and right mastectomy Mammogram: 05/10/2024    Screening Current   Cervical Cancer Screening Between the ages of 21-29, pap smear recommended once every 3 years.   Between the ages of 30-65, can perform pap smear with HPV co-testing every 5 years.   Recommendations may differ for women with a history of total hysterectomy, cervical cancer, or abnormal pap smears in past. Pap Smear: 03/03/2020    Screening Not Indicated   Hepatitis C Screening Once for adults born between 1945 and 1965  More frequently in  patients at high risk for Hepatitis C Hep C Antibody: 04/15/2020    Screening Current   Diabetes Screening 1-2 times per year if you're at risk for diabetes or have pre-diabetes Fasting glucose: 88 mg/dL (5/23/2025)  A1C: No results in last 5 years (No results in last 5 years)  Screening Current   Cholesterol Screening Once every 5 years if you don't have a lipid disorder. May order more often based on risk factors. Lipid panel: 05/23/2025    Screening Not Indicated  History Lipid Disorder     Other Preventive Screenings Covered by Medicare:  Abdominal Aortic Aneurysm (AAA) Screening: covered once if your at risk. You're considered to be at risk if you have a family history of AAA.  Lung Cancer Screening: covers low dose CT scan once per year if you meet all of the following conditions: (1) Age 55-77; (2) No signs or symptoms of lung cancer; (3) Current smoker or have quit smoking within the last 15 years; (4) You have a tobacco smoking history of at least 20 pack years (packs per day multiplied by number of years you smoked); (5) You get a written order from a healthcare provider.  Glaucoma Screening: covered annually if you're considered high risk: (1) You have diabetes OR (2) Family history of glaucoma OR (3)  aged 50 and older OR (4)  American aged 65 and older  Osteoporosis Screening: covered every 2 years if you meet one of the following conditions: (1) You're estrogen deficient and at risk for osteoporosis based off medical history and other findings; (2) Have a vertebral abnormality; (3) On glucocorticoid therapy for more than 3 months; (4) Have primary hyperparathyroidism; (5) On osteoporosis medications and need to assess response to drug therapy.   Last bone density test (DXA Scan): 09/16/2024.  HIV Screening: covered annually if you're between the age of 15-65. Also covered annually if you are younger than 15 and older than 65 with risk factors for HIV infection. For pregnant  patients, it is covered up to 3 times per pregnancy.    Immunizations:  Immunization Recommendations   Influenza Vaccine Annual influenza vaccination during flu season is recommended for all persons aged >= 6 months who do not have contraindications   Pneumococcal Vaccine   * Pneumococcal conjugate vaccine = PCV13 (Prevnar 13), PCV15 (Vaxneuvance), PCV20 (Prevnar 20)  * Pneumococcal polysaccharide vaccine = PPSV23 (Pneumovax) Adults 19-63 yo with certain risk factors or if 65+ yo  If never received any pneumonia vaccine: recommend Prevnar 20 (PCV20)  Give PCV20 if previously received 1 dose of PCV13 or PPSV23   Hepatitis B Vaccine 3 dose series if at intermediate or high risk (ex: diabetes, end stage renal disease, liver disease)   Respiratory syncytial virus (RSV) Vaccine - COVERED BY MEDICARE PART D  * RSVPreF3 (Arexvy) CDC recommends that adults 60 years of age and older may receive a single dose of RSV vaccine using shared clinical decision-making (SCDM)   Tetanus (Td) Vaccine - COST NOT COVERED BY MEDICARE PART B Following completion of primary series, a booster dose should be given every 10 years to maintain immunity against tetanus. Td may also be given as tetanus wound prophylaxis.   Tdap Vaccine - COST NOT COVERED BY MEDICARE PART B Recommended at least once for all adults. For pregnant patients, recommended with each pregnancy.   Shingles Vaccine (Shingrix) - COST NOT COVERED BY MEDICARE PART B  2 shot series recommended in those 19 years and older who have or will have weakened immune systems or those 50 years and older     Health Maintenance Due:      Topic Date Due   • Breast Cancer Screening: Mammogram  05/10/2025   • DXA SCAN  09/16/2025   • Colorectal Cancer Screening  03/07/2027   • Hepatitis C Screening  Completed     Immunizations Due:      Topic Date Due   • COVID-19 Vaccine (3 - 2024-25 season) 09/01/2024     Advance Directives   What are advance directives?  Advance directives are legal  documents that state your wishes and plans for medical care. These plans are made ahead of time in case you lose your ability to make decisions for yourself. Advance directives can apply to any medical decision, such as the treatments you want, and if you want to donate organs.   What are the types of advance directives?  There are many types of advance directives, and each state has rules about how to use them. You may choose a combination of any of the following:  Living will:  This is a written record of the treatment you want. You can also choose which treatments you do not want, which to limit, and which to stop at a certain time. This includes surgery, medicine, IV fluid, and tube feedings.   Durable power of  for healthcare (DPAHC):  This is a written record that states who you want to make healthcare choices for you when you are unable to make them for yourself. This person, called a proxy, is usually a family member or a friend. You may choose more than 1 proxy.  Do not resuscitate (DNR) order:  A DNR order is used in case your heart stops beating or you stop breathing. It is a request not to have certain forms of treatment, such as CPR. A DNR order may be included in other types of advance directives.  Medical directive:  This covers the care that you want if you are in a coma, near death, or unable to make decisions for yourself. You can list the treatments you want for each condition. Treatment may include pain medicine, surgery, blood transfusions, dialysis, IV or tube feedings, and a ventilator (breathing machine).  Values history:  This document has questions about your views, beliefs, and how you feel and think about life. This information can help others choose the care that you would choose.  Why are advance directives important?  An advance directive helps you control your care. Although spoken wishes may be used, it is better to have your wishes written down. Spoken wishes can be  misunderstood, or not followed. Treatments may be given even if you do not want them. An advance directive may make it easier for your family to make difficult choices about your care.   Weight Management   Why it is important to manage your weight:  Being overweight increases your risk of health conditions such as heart disease, high blood pressure, type 2 diabetes, and certain types of cancer. It can also increase your risk for osteoarthritis, sleep apnea, and other respiratory problems. Aim for a slow, steady weight loss. Even a small amount of weight loss can lower your risk of health problems.  How to lose weight safely:  A safe and healthy way to lose weight is to eat fewer calories and get regular exercise. You can lose up about 1 pound a week by decreasing the number of calories you eat by 500 calories each day.   Healthy meal plan for weight management:  A healthy meal plan includes a variety of foods, contains fewer calories, and helps you stay healthy. A healthy meal plan includes the following:  Eat whole-grain foods more often.  A healthy meal plan should contain fiber. Fiber is the part of grains, fruits, and vegetables that is not broken down by your body. Whole-grain foods are healthy and provide extra fiber in your diet. Some examples of whole-grain foods are whole-wheat breads and pastas, oatmeal, brown rice, and bulgur.  Eat a variety of vegetables every day.  Include dark, leafy greens such as spinach, kale, leila greens, and mustard greens. Eat yellow and orange vegetables such as carrots, sweet potatoes, and winter squash.   Eat a variety of fruits every day.  Choose fresh or canned fruit (canned in its own juice or light syrup) instead of juice. Fruit juice has very little or no fiber.  Eat low-fat dairy foods.  Drink fat-free (skim) milk or 1% milk. Eat fat-free yogurt and low-fat cottage cheese. Try low-fat cheeses such as mozzarella and other reduced-fat cheeses.  Choose meat and other  protein foods that are low in fat.  Choose beans or other legumes such as split peas or lentils. Choose fish, skinless poultry (chicken or turkey), or lean cuts of red meat (beef or pork). Before you cook meat or poultry, cut off any visible fat.   Use less fat and oil.  Try baking foods instead of frying them. Add less fat, such as margarine, sour cream, regular salad dressing and mayonnaise to foods. Eat fewer high-fat foods. Some examples of high-fat foods include french fries, doughnuts, ice cream, and cakes.  Eat fewer sweets.  Limit foods and drinks that are high in sugar. This includes candy, cookies, regular soda, and sweetened drinks.  Exercise:  Exercise at least 30 minutes per day on most days of the week. Some examples of exercise include walking, biking, dancing, and swimming. You can also fit in more physical activity by taking the stairs instead of the elevator or parking farther away from stores. Ask your healthcare provider about the best exercise plan for you.    © Copyright [employer] 2018 Information is for End User's use only and may not be sold, redistributed or otherwise used for commercial purposes. All illustrations and images included in CareNotes® are the copyrighted property of [employer] or [employer]

## 2025-05-29 NOTE — PROGRESS NOTES
Name: Bertha Segundo      : 1955      MRN: [id number]  Encounter Provider: Mouna Dodson DO  Encounter Date: 2025   Encounter department: [facility]  :  Assessment & Plan  Medicare annual wellness visit, subsequent         LLQ discomfort    Orders:    CT abdomen pelvis w contrast; Future    Bloating symptom    Orders:    CT abdomen pelvis w contrast; Future    Diverticulosis of colon  On colonoscopy 2017 per records  Advised to start bulk fiber daily and probiotic daily (1 cup yogurt daily would suffice)  Orders:    CT abdomen pelvis w contrast; Future    Benign essential hypertension    Orders:    CT abdomen pelvis w contrast; Future    Mixed hyperlipidemia         BMI 38.0-38.9,adult            Preventive health issues were discussed with patient, and age appropriate screening tests were ordered as noted in patient's After Visit Summary. Personalized health advice and appropriate referrals for health education or preventive services given if needed, as noted in patient's After Visit Summary.    History of Present Illness     Medicare AWV + f/u  Also states she had 2 episodes of what she thinks was indigestion  Charles May 18th felt very bloated and discomfort left lower abd, not really pain, no constipation or diarrhea, but uncomfortable all during the night and then around 2AM threw up and it relieved it  Then few days later after eating piece of pizza got the same sx and during middle of the night was awakened by discomfort LLQ and bloating, and threw up and sx resolved  Then Monday had burgers and hot dogs and had more minor episode, resolved spontaneously/with walking/sitting up straight and no vomiting with this episode  In between episodes feels fine       Patient Care Team:  Mouna Dodson DO as PCP - General (Family Medicine)    Review of Systems   All other systems reviewed and are negative.    Medical History Reviewed by provider this encounter:  Tobacco   Allergies  Meds  Problems  Med Hx  Surg Hx  Fam Hx       Annual Wellness Visit Questionnaire   Bertha is here for her Subsequent Wellness visit. Last Medicare Wellness visit information reviewed, patient interviewed and updates made to the record today.      Health Risk Assessment:   Patient rates overall health as good. Patient feels that their physical health rating is same. Patient is satisfied with their life. Eyesight was rated as slightly worse. Hearing was rated as same. Patient feels that their emotional and mental health rating is same. Patients states they are never, rarely angry. Patient states they are never, rarely unusually tired/fatigued. Pain experienced in the last 7 days has been none. Patient states that she has experienced no weight loss or gain in last 6 months.     Depression Screening:   PHQ-2 Score: 0      Fall Risk Screening:   In the past year, patient has experienced: no history of falling in past year      Urinary Incontinence Screening:   Patient has not leaked urine accidently in the last six months.     Home Safety:  Patient does not have trouble with stairs inside or outside of their home. Patient has working smoke alarms and has working carbon monoxide detector. Home safety hazards include: none.     Nutrition:   Current diet is Regular.     Medications:   Patient is currently taking over-the-counter supplements. OTC medications include: see medication list. Patient is able to manage medications.     Activities of Daily Living (ADLs)/Instrumental Activities of Daily Living (IADLs):   Walk and transfer into and out of bed and chair?: Yes  Dress and groom yourself?: Yes    Bathe or shower yourself?: Yes    Feed yourself? Yes  Do your laundry/housekeeping?: Yes  Manage your money, pay your bills and track your expenses?: Yes  Make your own meals?: Yes    Do your own shopping?: Yes    Previous Hospitalizations:   Any hospitalizations or ED visits within the last 12 months?: No       Advance Care Planning:   Living will: Yes    Durable POA for healthcare: Yes    Advanced directive: Yes      Cognitive Screening:   Provider or family/friend/caregiver concerned regarding cognition?: No    Preventive Screenings      Cardiovascular Screening:    General: Screening Not Indicated, History Lipid Disorder and Screening Current      Diabetes Screening:     General: Screening Current      Colorectal Cancer Screening:     General: Screening Current      Breast Cancer Screening:     General: Screening Current      Cervical Cancer Screening:    General: Screening Not Indicated      Osteoporosis Screening:    General: Screening Current      Abdominal Aortic Aneurysm (AAA) Screening:        General: Screening Current      Lung Cancer Screening:     General: Screening Not Indicated      Hepatitis C Screening:    General: Screening Current    Immunizations:  - Immunizations due: Zoster (Shingrix)  - Risks/benefits immunizations discussed      Screening, Brief Intervention, and Referral to Treatment (SBIRT)     Screening  Typical number of drinks in a day: 0  Typical number of drinks in a week: 0  Interpretation: Low risk drinking behavior.    Single Item Drug Screening:  How often have you used an illegal drug (including marijuana) or a prescription medication for non-medical reasons in the past year? never    Single Item Drug Screen Score: 0  Interpretation: Negative screen for possible drug use disorder    Social Drivers of Health     Financial Resource Strain: Low Risk  (5/26/2023)    Overall Financial Resource Strain (CARDIA)     Difficulty of Paying Living Expenses: Not hard at all   Food Insecurity: No Food Insecurity (5/29/2025)    Nursing - Inadequate Food Risk Classification     Worried About Running Out of Food in the Last Year: Never true     Ran Out of Food in the Last Year: Never true   Transportation Needs: No Transportation Needs (5/29/2025)    PRAPARE - Transportation     Lack of  "Transportation (Medical): No     Lack of Transportation (Non-Medical): No   Housing Stability: Low Risk  (5/29/2025)    Housing Stability Vital Sign     Unable to Pay for Housing in the Last Year: No     Number of Times Moved in the Last Year: 0     Homeless in the Last Year: No   Utilities: Not At Risk (5/29/2025)    [facility] Utilities     Threatened with loss of utilities: No     No results found.    Objective   /84 (BP Location: Left arm, Patient Position: Sitting, Cuff Size: Large)   Pulse 63   Temp (!) 97.1 °F (36.2 °C) (Tympanic)   Resp 18   Ht 5' 2\" (1.575 m)   Wt 95.6 kg (210 lb 12.8 oz)   LMP 07/01/2009 (Exact Date)   SpO2 99%   BMI 38.56 kg/m²     Physical Exam  Vitals and nursing note reviewed.   Constitutional:       General: She is not in acute distress.     Appearance: She is well-groomed. She is obese. She is not ill-appearing, toxic-appearing or diaphoretic.   HENT:      Head: Normocephalic and atraumatic.      Nose: Nose normal.      Mouth/Throat:      Lips: Pink.      Mouth: Mucous membranes are moist.      Pharynx: Oropharynx is clear.     Eyes:      Conjunctiva/sclera: Conjunctivae normal.       Cardiovascular:      Rate and Rhythm: Normal rate and regular rhythm.      Heart sounds: Normal heart sounds.   Pulmonary:      Effort: Pulmonary effort is normal.      Breath sounds: Normal breath sounds and air entry.   Abdominal:      General: Bowel sounds are normal. There is no distension or abdominal bruit.      Palpations: Abdomen is soft. There is no hepatomegaly, splenomegaly or mass.      Tenderness: There is no abdominal tenderness.      Hernia: There is no hernia in the ventral area.     Musculoskeletal:      Right lower leg: No edema.      Left lower leg: No edema.     Skin:     General: Skin is warm and dry.      Coloration: Skin is not jaundiced or pale.     Neurological:      General: No focal deficit present.      Mental Status: She is alert and oriented to person, place, and " time.      Gait: Gait normal.     Psychiatric:         Mood and Affect: Mood normal.         Behavior: Behavior normal. Behavior is cooperative.         Cognition and Memory: Cognition and memory normal.

## 2025-05-30 ENCOUNTER — HOSPITAL ENCOUNTER (OUTPATIENT)
Dept: MAMMOGRAPHY | Facility: HOSPITAL | Age: 70
Discharge: HOME | End: 2025-05-30
Payer: MEDICARE

## 2025-05-30 DIAGNOSIS — Z12.31 BREAST CANCER SCREENING BY MAMMOGRAM: ICD-10-CM

## 2025-05-30 PROCEDURE — 77063 BREAST TOMOSYNTHESIS BI: CPT

## 2025-05-30 PROCEDURE — 77067 SCR MAMMO BI INCL CAD: CPT

## 2025-05-31 PROBLEM — K57.30 DIVERTICULOSIS OF COLON: Status: ACTIVE | Noted: 2025-05-31

## 2025-06-01 NOTE — ASSESSMENT & PLAN NOTE
On colonoscopy 2017 per records  Advised to start bulk fiber daily and probiotic daily (1 cup yogurt daily would suffice)  Orders:    CT abdomen pelvis w contrast; Future

## 2025-06-10 ENCOUNTER — HOSPITAL ENCOUNTER (OUTPATIENT)
Dept: CT IMAGING | Facility: HOSPITAL | Age: 70
Discharge: HOME | End: 2025-06-10
Attending: FAMILY MEDICINE
Payer: MEDICARE

## 2025-06-10 DIAGNOSIS — I10 BENIGN ESSENTIAL HYPERTENSION: ICD-10-CM

## 2025-06-10 DIAGNOSIS — R14.0 BLOATING SYMPTOM: ICD-10-CM

## 2025-06-10 DIAGNOSIS — K57.30 DIVERTICULOSIS OF COLON: ICD-10-CM

## 2025-06-10 DIAGNOSIS — R10.32 LLQ DISCOMFORT: ICD-10-CM

## 2025-06-10 PROCEDURE — 74177 CT ABD & PELVIS W/CONTRAST: CPT

## 2025-06-10 RX ADMIN — IOHEXOL 100 ML: 350 INJECTION, SOLUTION INTRAVENOUS at 08:31

## 2025-06-17 ENCOUNTER — RESULTS FOLLOW-UP (OUTPATIENT)
Dept: FAMILY MEDICINE CLINIC | Facility: CLINIC | Age: 70
End: 2025-06-17

## 2025-06-17 DIAGNOSIS — K57.32 DIVERTICULITIS OF SIGMOID COLON: Primary | ICD-10-CM

## 2025-06-18 ENCOUNTER — TELEPHONE (OUTPATIENT)
Age: 70
End: 2025-06-18

## 2025-06-18 NOTE — TELEPHONE ENCOUNTER
Patient called in stated that she had a CT on Monday 6/16/2025. She started a rash on her arms that night - Patient isn't sure if it was from the stuff she had to take before the CT. She also stated that she had the Rash Tuesday morning. She is treating it with over the counter meds that seems to be helping. Patient just wanted to let Dr. Dodson know as she is going to start taking the amoxicillin-clavulanate today that was ordered.     Thank you

## 2025-07-16 DIAGNOSIS — I10 BENIGN ESSENTIAL HYPERTENSION: ICD-10-CM

## 2025-07-17 RX ORDER — AMLODIPINE AND BENAZEPRIL HYDROCHLORIDE 5; 20 MG/1; MG/1
1 CAPSULE ORAL DAILY
Qty: 90 CAPSULE | Refills: 1 | Status: SHIPPED | OUTPATIENT
Start: 2025-07-17

## 2025-07-23 DIAGNOSIS — I10 BENIGN ESSENTIAL HYPERTENSION: ICD-10-CM

## 2025-07-24 RX ORDER — HYDROCHLOROTHIAZIDE 25 MG/1
25 TABLET ORAL DAILY
Qty: 90 TABLET | Refills: 1 | Status: SHIPPED | OUTPATIENT
Start: 2025-07-24

## 2025-08-07 DIAGNOSIS — E78.2 MIXED HYPERLIPIDEMIA: ICD-10-CM

## 2025-08-08 RX ORDER — PRAVASTATIN SODIUM 10 MG
10 TABLET ORAL EVERY MORNING
Qty: 90 TABLET | Refills: 1 | Status: SHIPPED | OUTPATIENT
Start: 2025-08-08